# Patient Record
Sex: MALE | Race: OTHER | Employment: UNEMPLOYED | URBAN - METROPOLITAN AREA
[De-identification: names, ages, dates, MRNs, and addresses within clinical notes are randomized per-mention and may not be internally consistent; named-entity substitution may affect disease eponyms.]

---

## 2024-01-01 ENCOUNTER — TELEPHONE (OUTPATIENT)
Dept: UROLOGY | Age: 54
End: 2024-01-01

## 2024-07-10 PROBLEM — D64.9 NORMOCYTIC ANEMIA: Status: ACTIVE | Noted: 2024-07-10

## 2024-07-10 PROBLEM — E11.9 DM (DIABETES MELLITUS) (HCC): Status: ACTIVE | Noted: 2024-07-10

## 2024-07-10 PROBLEM — N17.9 AKI (ACUTE KIDNEY INJURY) (HCC): Status: ACTIVE | Noted: 2024-07-10

## 2024-07-10 PROBLEM — R80.9 PROTEINURIA: Status: ACTIVE | Noted: 2024-07-10

## 2024-07-10 PROBLEM — E77.8 HYPOPROTEINEMIA (HCC): Status: ACTIVE | Noted: 2024-07-10

## 2024-07-10 PROBLEM — I16.1 HYPERTENSIVE EMERGENCY: Status: ACTIVE | Noted: 2024-07-10

## 2024-07-18 PROBLEM — N50.89 SCROTAL EDEMA: Status: ACTIVE | Noted: 2024-07-18

## 2024-07-24 NOTE — TELEPHONE ENCOUNTER
Pt's Bon [employer] Rep came in practice to follow up on pt referral.  Pt was in hospital on 07/10/2024. Saw Sherrie. NP were away at lunch when representative came by practice. Spoke with Sharlene who suggest putting pt on cath schedule for Monday, 07/29/2024 for voiding trial. If voiding trial fail then we would reinsert cath and schedule him with a NP.

## 2024-07-25 ENCOUNTER — OFFICE VISIT (OUTPATIENT)
Dept: PRIMARY CARE CLINIC | Facility: CLINIC | Age: 54
End: 2024-07-25

## 2024-07-25 ENCOUNTER — HOSPITAL ENCOUNTER (INPATIENT)
Age: 54
LOS: 6 days | Discharge: HOME | End: 2024-07-31
Attending: STUDENT IN AN ORGANIZED HEALTH CARE EDUCATION/TRAINING PROGRAM | Admitting: FAMILY MEDICINE

## 2024-07-25 ENCOUNTER — APPOINTMENT (OUTPATIENT)
Dept: GENERAL RADIOLOGY | Age: 54
End: 2024-07-25

## 2024-07-25 VITALS
DIASTOLIC BLOOD PRESSURE: 65 MMHG | HEIGHT: 62 IN | SYSTOLIC BLOOD PRESSURE: 133 MMHG | RESPIRATION RATE: 18 BRPM | HEART RATE: 88 BPM | TEMPERATURE: 98.3 F | BODY MASS INDEX: 35.7 KG/M2 | OXYGEN SATURATION: 88 % | WEIGHT: 194 LBS

## 2024-07-25 DIAGNOSIS — R06.02 SHORTNESS OF BREATH: Primary | ICD-10-CM

## 2024-07-25 DIAGNOSIS — N17.9 AKI (ACUTE KIDNEY INJURY) (HCC): Primary | ICD-10-CM

## 2024-07-25 DIAGNOSIS — I50.9 ACUTE ON CHRONIC CONGESTIVE HEART FAILURE, UNSPECIFIED HEART FAILURE TYPE (HCC): ICD-10-CM

## 2024-07-25 DIAGNOSIS — N18.9 CHRONIC KIDNEY DISEASE, UNSPECIFIED CKD STAGE: ICD-10-CM

## 2024-07-25 DIAGNOSIS — R60.1 ANASARCA: ICD-10-CM

## 2024-07-25 DIAGNOSIS — E11.29 TYPE 2 DIABETES MELLITUS WITH OTHER DIABETIC KIDNEY COMPLICATION, WITH LONG-TERM CURRENT USE OF INSULIN (HCC): ICD-10-CM

## 2024-07-25 DIAGNOSIS — Z79.4 TYPE 2 DIABETES MELLITUS WITH OTHER DIABETIC KIDNEY COMPLICATION, WITH LONG-TERM CURRENT USE OF INSULIN (HCC): ICD-10-CM

## 2024-07-25 PROBLEM — J81.0 PULMONARY EDEMA, ACUTE (HCC): Status: ACTIVE | Noted: 2024-07-25

## 2024-07-25 LAB
ALBUMIN SERPL-MCNC: 1.7 G/DL (ref 3.5–5)
ALBUMIN/GLOB SERPL: 0.4 (ref 1–1.9)
ALP SERPL-CCNC: 183 U/L (ref 40–129)
ALT SERPL-CCNC: 19 U/L (ref 12–65)
ANION GAP SERPL CALC-SCNC: 9 MMOL/L (ref 9–18)
AST SERPL-CCNC: 31 U/L (ref 15–37)
BASOPHILS # BLD: 0 K/UL (ref 0–0.2)
BASOPHILS NFR BLD: 1 % (ref 0–2)
BILIRUB SERPL-MCNC: <0.2 MG/DL (ref 0–1.2)
BILIRUB UR QL: NEGATIVE
BUN SERPL-MCNC: 56 MG/DL (ref 6–23)
CALCIUM SERPL-MCNC: 8.3 MG/DL (ref 8.8–10.2)
CHLORIDE SERPL-SCNC: 96 MMOL/L (ref 98–107)
CO2 SERPL-SCNC: 24 MMOL/L (ref 20–28)
CREAT SERPL-MCNC: 4.36 MG/DL (ref 0.8–1.3)
DIFFERENTIAL METHOD BLD: ABNORMAL
EOSINOPHIL # BLD: 0.3 K/UL (ref 0–0.8)
EOSINOPHIL NFR BLD: 3 % (ref 0.5–7.8)
ERYTHROCYTE [DISTWIDTH] IN BLOOD BY AUTOMATED COUNT: 13.3 % (ref 11.9–14.6)
GLOBULIN SER CALC-MCNC: 4.6 G/DL (ref 2.3–3.5)
GLUCOSE BLD STRIP.AUTO-MCNC: 136 MG/DL (ref 65–100)
GLUCOSE BLD STRIP.AUTO-MCNC: 137 MG/DL (ref 65–100)
GLUCOSE SERPL-MCNC: 172 MG/DL (ref 70–99)
GLUCOSE UR QL STRIP.AUTO: 100 MG/DL
GLUCOSE, POC: 203 MG/DL
HCT VFR BLD AUTO: 26.7 % (ref 41.1–50.3)
HGB BLD-MCNC: 8.8 G/DL (ref 13.6–17.2)
IMM GRANULOCYTES # BLD AUTO: 0 K/UL (ref 0–0.5)
IMM GRANULOCYTES NFR BLD AUTO: 0 % (ref 0–5)
KETONES UR-MCNC: NEGATIVE MG/DL
LEUKOCYTE ESTERASE UR QL STRIP: ABNORMAL
LYMPHOCYTES # BLD: 1.5 K/UL (ref 0.5–4.6)
LYMPHOCYTES NFR BLD: 17 % (ref 13–44)
MAGNESIUM SERPL-MCNC: 2.4 MG/DL (ref 1.8–2.4)
MCH RBC QN AUTO: 30.9 PG (ref 26.1–32.9)
MCHC RBC AUTO-ENTMCNC: 33 G/DL (ref 31.4–35)
MCV RBC AUTO: 93.7 FL (ref 82–102)
MONOCYTES # BLD: 0.5 K/UL (ref 0.1–1.3)
MONOCYTES NFR BLD: 6 % (ref 4–12)
NEUTS SEG # BLD: 6.5 K/UL (ref 1.7–8.2)
NEUTS SEG NFR BLD: 73 % (ref 43–78)
NITRITE UR QL: NEGATIVE
NRBC # BLD: 0 K/UL (ref 0–0.2)
NT PRO BNP: ABNORMAL PG/ML (ref 0–125)
PH UR: 6 (ref 5–9)
PLATELET # BLD AUTO: 399 K/UL (ref 150–450)
PMV BLD AUTO: 8.5 FL (ref 9.4–12.3)
POTASSIUM SERPL-SCNC: 3.9 MMOL/L (ref 3.5–5.1)
PROT SERPL-MCNC: 6.3 G/DL (ref 6.3–8.2)
PROT UR QL: >300 MG/DL
RBC # BLD AUTO: 2.85 M/UL (ref 4.23–5.6)
RBC # UR STRIP: ABNORMAL
SERVICE CMNT-IMP: ABNORMAL
SODIUM SERPL-SCNC: 130 MMOL/L (ref 136–145)
SP GR UR: 1.02 (ref 1–1.02)
TROPONIN T SERPL HS-MCNC: 127 NG/L (ref 0–22)
TROPONIN T SERPL HS-MCNC: 143 NG/L (ref 0–22)
TROPONIN T SERPL HS-MCNC: 143 NG/L (ref 0–22)
UROBILINOGEN UR QL: 0.2 EU/DL (ref 0.2–1)
WBC # BLD AUTO: 8.9 K/UL (ref 4.3–11.1)

## 2024-07-25 PROCEDURE — 84484 ASSAY OF TROPONIN QUANT: CPT

## 2024-07-25 PROCEDURE — 83735 ASSAY OF MAGNESIUM: CPT

## 2024-07-25 PROCEDURE — 2580000003 HC RX 258: Performed by: FAMILY MEDICINE

## 2024-07-25 PROCEDURE — 82962 GLUCOSE BLOOD TEST: CPT

## 2024-07-25 PROCEDURE — 93005 ELECTROCARDIOGRAM TRACING: CPT | Performed by: STUDENT IN AN ORGANIZED HEALTH CARE EDUCATION/TRAINING PROGRAM

## 2024-07-25 PROCEDURE — 99285 EMERGENCY DEPT VISIT HI MDM: CPT

## 2024-07-25 PROCEDURE — 1100000000 HC RM PRIVATE

## 2024-07-25 PROCEDURE — 6370000000 HC RX 637 (ALT 250 FOR IP): Performed by: FAMILY MEDICINE

## 2024-07-25 PROCEDURE — 83880 ASSAY OF NATRIURETIC PEPTIDE: CPT

## 2024-07-25 PROCEDURE — 80053 COMPREHEN METABOLIC PANEL: CPT

## 2024-07-25 PROCEDURE — 96374 THER/PROPH/DIAG INJ IV PUSH: CPT

## 2024-07-25 PROCEDURE — 81003 URINALYSIS AUTO W/O SCOPE: CPT

## 2024-07-25 PROCEDURE — 85025 COMPLETE CBC W/AUTO DIFF WBC: CPT

## 2024-07-25 PROCEDURE — 6360000002 HC RX W HCPCS: Performed by: STUDENT IN AN ORGANIZED HEALTH CARE EDUCATION/TRAINING PROGRAM

## 2024-07-25 PROCEDURE — 36415 COLL VENOUS BLD VENIPUNCTURE: CPT

## 2024-07-25 PROCEDURE — 71046 X-RAY EXAM CHEST 2 VIEWS: CPT

## 2024-07-25 RX ORDER — INSULIN GLARGINE 100 [IU]/ML
3 INJECTION, SOLUTION SUBCUTANEOUS EVERY MORNING
Status: DISCONTINUED | OUTPATIENT
Start: 2024-07-26 | End: 2024-07-28

## 2024-07-25 RX ORDER — DEXTROSE MONOHYDRATE 100 MG/ML
INJECTION, SOLUTION INTRAVENOUS CONTINUOUS PRN
Status: DISCONTINUED | OUTPATIENT
Start: 2024-07-25 | End: 2024-07-31 | Stop reason: HOSPADM

## 2024-07-25 RX ORDER — SODIUM CHLORIDE 9 MG/ML
INJECTION, SOLUTION INTRAVENOUS PRN
Status: DISCONTINUED | OUTPATIENT
Start: 2024-07-25 | End: 2024-07-31 | Stop reason: HOSPADM

## 2024-07-25 RX ORDER — BISACODYL 10 MG
10 SUPPOSITORY, RECTAL RECTAL DAILY PRN
Status: DISCONTINUED | OUTPATIENT
Start: 2024-07-25 | End: 2024-07-31 | Stop reason: HOSPADM

## 2024-07-25 RX ORDER — AMLODIPINE BESYLATE 10 MG/1
10 TABLET ORAL DAILY
Status: DISCONTINUED | OUTPATIENT
Start: 2024-07-26 | End: 2024-07-31 | Stop reason: HOSPADM

## 2024-07-25 RX ORDER — POLYETHYLENE GLYCOL 3350 17 G/17G
17 POWDER, FOR SOLUTION ORAL DAILY PRN
Status: DISCONTINUED | OUTPATIENT
Start: 2024-07-25 | End: 2024-07-31 | Stop reason: HOSPADM

## 2024-07-25 RX ORDER — HYDRALAZINE HYDROCHLORIDE 50 MG/1
50 TABLET, FILM COATED ORAL EVERY 8 HOURS SCHEDULED
Status: DISCONTINUED | OUTPATIENT
Start: 2024-07-25 | End: 2024-07-31 | Stop reason: HOSPADM

## 2024-07-25 RX ORDER — INSULIN LISPRO 100 [IU]/ML
0-8 INJECTION, SOLUTION INTRAVENOUS; SUBCUTANEOUS
Status: DISCONTINUED | OUTPATIENT
Start: 2024-07-25 | End: 2024-07-31 | Stop reason: HOSPADM

## 2024-07-25 RX ORDER — ACETAMINOPHEN 325 MG/1
650 TABLET ORAL EVERY 6 HOURS PRN
Status: DISCONTINUED | OUTPATIENT
Start: 2024-07-25 | End: 2024-07-31 | Stop reason: HOSPADM

## 2024-07-25 RX ORDER — IBUPROFEN 600 MG/1
1 TABLET ORAL PRN
Status: DISCONTINUED | OUTPATIENT
Start: 2024-07-25 | End: 2024-07-31 | Stop reason: HOSPADM

## 2024-07-25 RX ORDER — TAMSULOSIN HYDROCHLORIDE 0.4 MG/1
0.4 CAPSULE ORAL DAILY
Status: DISCONTINUED | OUTPATIENT
Start: 2024-07-25 | End: 2024-07-31 | Stop reason: HOSPADM

## 2024-07-25 RX ORDER — MAGNESIUM HYDROXIDE/ALUMINUM HYDROXICE/SIMETHICONE 120; 1200; 1200 MG/30ML; MG/30ML; MG/30ML
30 SUSPENSION ORAL EVERY 6 HOURS PRN
Status: DISCONTINUED | OUTPATIENT
Start: 2024-07-25 | End: 2024-07-31 | Stop reason: HOSPADM

## 2024-07-25 RX ORDER — TORSEMIDE 20 MG/1
60 TABLET ORAL DAILY
Status: DISCONTINUED | OUTPATIENT
Start: 2024-07-25 | End: 2024-07-31

## 2024-07-25 RX ORDER — INSULIN LISPRO 100 [IU]/ML
0-4 INJECTION, SOLUTION INTRAVENOUS; SUBCUTANEOUS NIGHTLY
Status: DISCONTINUED | OUTPATIENT
Start: 2024-07-25 | End: 2024-07-31 | Stop reason: HOSPADM

## 2024-07-25 RX ORDER — FUROSEMIDE 10 MG/ML
60 INJECTION INTRAMUSCULAR; INTRAVENOUS ONCE
Status: COMPLETED | OUTPATIENT
Start: 2024-07-25 | End: 2024-07-25

## 2024-07-25 RX ORDER — ACETAMINOPHEN 650 MG/1
650 SUPPOSITORY RECTAL EVERY 6 HOURS PRN
Status: DISCONTINUED | OUTPATIENT
Start: 2024-07-25 | End: 2024-07-31 | Stop reason: HOSPADM

## 2024-07-25 RX ORDER — SODIUM CHLORIDE 0.9 % (FLUSH) 0.9 %
5-40 SYRINGE (ML) INJECTION PRN
Status: DISCONTINUED | OUTPATIENT
Start: 2024-07-25 | End: 2024-07-31 | Stop reason: HOSPADM

## 2024-07-25 RX ORDER — FAMOTIDINE 20 MG/1
10 TABLET, FILM COATED ORAL DAILY PRN
Status: DISCONTINUED | OUTPATIENT
Start: 2024-07-25 | End: 2024-07-31 | Stop reason: HOSPADM

## 2024-07-25 RX ORDER — SODIUM CHLORIDE 0.9 % (FLUSH) 0.9 %
5-40 SYRINGE (ML) INJECTION EVERY 12 HOURS SCHEDULED
Status: DISCONTINUED | OUTPATIENT
Start: 2024-07-25 | End: 2024-07-31 | Stop reason: HOSPADM

## 2024-07-25 RX ADMIN — HYDRALAZINE HYDROCHLORIDE 50 MG: 50 TABLET ORAL at 20:11

## 2024-07-25 RX ADMIN — TORSEMIDE 60 MG: 20 TABLET ORAL at 20:11

## 2024-07-25 RX ADMIN — TAMSULOSIN HYDROCHLORIDE 0.4 MG: 0.4 CAPSULE ORAL at 20:11

## 2024-07-25 RX ADMIN — FUROSEMIDE 60 MG: 10 INJECTION, SOLUTION INTRAMUSCULAR; INTRAVENOUS at 16:44

## 2024-07-25 RX ADMIN — SODIUM CHLORIDE, PRESERVATIVE FREE 10 ML: 5 INJECTION INTRAVENOUS at 20:11

## 2024-07-25 ASSESSMENT — PAIN SCALES - GENERAL
PAINLEVEL_OUTOF10: 0
PAINLEVEL_OUTOF10: 0

## 2024-07-25 ASSESSMENT — ENCOUNTER SYMPTOMS
CONSTIPATION: 0
SHORTNESS OF BREATH: 1
DIARRHEA: 0
COUGH: 0
ABDOMINAL DISTENTION: 1
STRIDOR: 0
NAUSEA: 0
CHOKING: 0
COLOR CHANGE: 0
VOMITING: 0

## 2024-07-25 ASSESSMENT — LIFESTYLE VARIABLES
HOW MANY STANDARD DRINKS CONTAINING ALCOHOL DO YOU HAVE ON A TYPICAL DAY: 1 OR 2
HOW OFTEN DO YOU HAVE A DRINK CONTAINING ALCOHOL: MONTHLY OR LESS

## 2024-07-25 NOTE — ED PROVIDER NOTES
Emergency Department Provider Note       PCP: No primary care provider on file.   Age: 53 y.o.   Sex: male     DISPOSITION Admitted 07/26/2024 03:36:02 PM       ICD-10-CM    1. ENDY (acute kidney injury) ([facility])  N17.9 Vascular duplex vein mapping upper right     Vascular duplex vein mapping upper right      2. Acute on chronic congestive heart failure, unspecified heart failure type (HCC)  I50.9       3. Chronic kidney disease, unspecified CKD stage  N18.9       4. Anasarca  R60.1           Medical Decision Making     Palauan-speaking male patient with history of chronic kidney disease, fluid overload returns to this department from local primary care office with progressive edema, shortness of breath  Review of note from office visit today as patient was being seen in follow-up after his recent admission, saturations between 90 and 88%, patient does not require oxygen at baseline  He reports compliance with current medications including his furosemide  He appears very edematous extending up to the abdomen and has Rales bilaterally  Will repeat lab work, obtain x-ray imaging.  Patient will require IV diuretics and likely admission    Laboratory testing appears overall fairly stable, patient appears significantly edematous consistent with anasarca in my opinion.  This is despite his furosemide use at home.  I think would benefit from admission, discussed this plan of care with patient voices understanding agreement.  Hospitalist, agrees to evaluate for admission    ED Course as of 07/26/24 1536   Thu Jul 25, 2024   1525 EKG interpretation: Sinus rhythm, rate 86, axis, no obvious ischemia [BR]   1631 EKG stable trop and BNP significantly elevated. Kidney function fairly stable with comparison to previous.  [BR]      ED Course User Index  [BR] Daniel Ramsey R, DO     1 or more acute illnesses that pose a threat to life or bodily function.   1 or more chronic illnesses with a severe exacerbation or  Comments: Edema present to the lower abdomen.  No rebound guarding or distention   Musculoskeletal:         General: No swelling, tenderness or deformity. Normal range of motion.      Cervical back: Normal range of motion.      Comments: Bilateral lower extremity edema extending the length of the legs bilaterally.  3+ pitting in nature.   Skin:     General: Skin is warm.      Capillary Refill: Capillary refill takes less than 2 seconds.   Neurological:      General: No focal deficit present.      Mental Status: He is alert.   Psychiatric:         Mood and Affect: Mood normal.        Procedures     Procedures    Orders Placed This Encounter   Procedures    XR CHEST (2 VW)    CBC with Auto Differential    Comprehensive Metabolic Panel    Magnesium    Brain Natriuretic Peptide    Troponin    Basic Metabolic Panel w/ Reflex to MG    Troponin    Troponin    Vitamin B12    Folate    Iron and TIBC    Transferrin    Ferritin    Lipid Panel    Brain Natriuretic Peptide    Magnesium    Phosphorus    Basic Metabolic Panel    Hemoglobin and Hematocrit    Platelet Count    TSH with Reflex    ADULT DIET; Regular; 4 carb choices (60 gm/meal); Low Fat/Low Chol/High Fiber/2 gm Na; 2000 ml    POCT Urine Dipstick    Vital signs (specify frequency)    Up with assistance    Initiate Hypoglycemia Treatment    Telemetry monitoring - 24 hour duration    Place intermittent pneumatic compression device    HYPOGLYCEMIA TREATMENT: blood glucose less than 70 mg/dL and patient ALERT and TOLERATING PO    HYPOGLYCEMIA TREATMENT: blood glucose less than 70 mg/dL and patient NOT ALERT or NPO    Strict intake and output    Bladder scan    Intake and output    Full code    Inpatient consult to Nephrology    Inpatient consult to Diabetes educator    Initiate Oxygen Therapy Protocol    POCT Glucose    POCT Urinalysis no Micro    POCT Glucose    POCT Glucose    POCT Glucose    POCT Glucose    EKG 12 Lead    ADMIT TO INPATIENT    Vascular duplex vein

## 2024-07-25 NOTE — ED TRIAGE NOTES
Pt to triage in wheelchair. Certified Filipino interpretor Joshua #[id number] used in triage process. Pt complains of SOB onset last PM. Pt also complains of generalized edema x2 days. Denies CP.

## 2024-07-25 NOTE — PROGRESS NOTES
Vince Avelar (:  1970) is a 53 y.o. male here for evaluation of the following chief complaint(s):  New Patient, Follow-Up from Hospital, Diabetes, Chronic Kidney Disease, and Shortness of Breath      Assessment & Plan   ASSESSMENT/PLAN:  1. Shortness of breath  Comments:  O2 saturation 88-90%, acutely worsening SOB since last night with worsening edema, pt referred to ER for further evaluation and management,Son will take him now  2. Type 2 diabetes mellitus with other diabetic kidney complication, with long-term current use of insulin ([facility])  -     AMB POC GLUCOSE BLOOD, BY GLUCOSE MONITORING DEVICE      Results for orders placed or performed in visit on 24   AMB POC GLUCOSE BLOOD, BY GLUCOSE MONITORING DEVICE   Result Value Ref Range    Glucose,  MG/DL        No follow-ups on file.         Subjective   SUBJECTIVE/OBJECTIVE:  New patient here today for a hospital follow up. He initially presented to [facility] (no records available) and had abnormal labs and was referred to the ER. From discharge summary:    \"Didier Avelar is a 53 y.o. Montenegrin-speaking male with a PMH of DM2 who presented to the on 7/10/2024 due to abnormal labs.  Patient noted worsening BLE edema over the last 3 months.  He was seen at a free clinic about 7-8 days PTA and noted to be hypertensive so he was started on losartan-HCTZ 50-12.5mg daily.  He had follow-up lab work around  which showed elevated creatinine so he was instructed to go to the ER for evaluation.       In the ER, he was noted to be hypertensive with /102.  Labs showed creatinine 3.62, proBNP 11k, WBC 9.8, and hemoglobin 10.7.  CXR showed pulmonary vascular congestion and bilateral pleural effusions.  He received Lasix 80 mg IV, nephrology was consulted, and he was admitted for further care.       Pt was admitted with ENDY in setting of urinary retention as well.  Nephrology was on board.  He was started on IV Lasix

## 2024-07-25 NOTE — PROGRESS NOTES
Patient arrived to floor, via stretcher, able to stand and pivot to the bed, with little assistance. Oliver patent, draining clear yellow urine. Generalized swelling throughout. Scrotum, edematous. Standing weight  190. Son at bedside. Verified medications with son, lasix prescriptions was filled at [facility], and patient has been compliant. Tele # [phone] NSR. Blood sugar obtained.  137.  Patient reporting tunneled vision, worse after visit to the optometrists.  Left eye worse than right.

## 2024-07-25 NOTE — H&P
Hospitalist History and Physical   Admit Date:  2024  3:37 PM   Name:  Vince Avelar   Age:  53 y.o.  Sex:  male  :  1970   MRN:  [id number]   Room:  ER    Presenting/Chief Complaint: Shortness of Breath     Reason(s) for Admission: Pulmonary edema, acute (HCC) [J81.0]     History of Present Illness:   Vince Avelar is a 53 y.o. male who presented with worsening edema and low oxygen readings 88% on RA over the past two days. Having SOB with no chest pain.     Hx of DM type II, HTN and recent discharge on  for pulmonary edema, anasarca, ENDY in setting of urinary retention with Oliver, nodular diabetic glomerulosclerosis now on toresemide 60mg daily but admits to not taking his new medication of torsemide    I used official Montserratian interpretor   Assessment & Plan:     Active Problems:    Pulmonary edema in setting of worsening renal failure - consult nephrology to see in AM. Strict Is and Os. FR. Start his torsemide 60mg daily. Lasix 60mg given in ER    Elevated troponin - no chest pain. Trend troponin. Remote tele     Normocytic anemia - stable    DM type II - A1C 6.9 on 7/10/24, SS, lantus 3 units in the AM. He denies taking the scheduled humalog 3 units before meals.     HTN - amlodipine, hydralazine,     BPH - flomax    Urinary retention - Still with Oliver       PT/OT evals ordered?  Not ordered; patient not expected to need rehab  Diet: ADULT DIET; Regular; 4 carb choices (60 gm/meal); Low Fat/Low Chol/High Fiber/2 gm Na; 2000 ml  VTE prophylaxis: SCD's   Code status: Full Code      Non-peripheral Lines and Tubes (if present):             Hospital Problems:  Principal Problem:    Pulmonary edema, acute (HCC)  Active Problems:    ENDY (acute kidney injury) (HCC)    DM (diabetes mellitus) (HCC)    Normocytic anemia  Resolved Problems:    * No resolved hospital problems. *        Objective:   Patient Vitals for the past 24 hrs:   Temp Pulse Resp BP SpO2  Negative NEG mg/dL    Bilirubin, Urine, POC Negative NEG      Blood, UA POC SMALL (A) NEG      URINE UROBILINOGEN POC 0.2 0.2 - 1.0 EU/dL    Nitrite, Urine, POC Negative NEG      Leukocyte Est, UA POC SMALL (A) NEG      Performed by: Ne Stacy        No results for input(s): \"COVID19\" in the last 72 hours.    XR CHEST (2 VW)    Result Date: 7/25/2024  Chest X-ray INDICATION: sob COMPARISON: 17 July 2024 TECHNIQUE: PA and lateral views of the chest were obtained.     Findings/impression: Ill-defined opacities at the bilateral lung bases likely represents a combination of pleural effusions and atelectasis. Prominence of interstitial markings and central pulmonary vasculature likely reflecting edema. The heart is enlarged. The findings taken together likely reflect CHF. Electronically signed by Ang Elmore        Signed:  CATERINA TIWARI DO    Part of this note may have been written by using a voice dictation software.  The note has been proof read but may still contain some grammatical/other typographical errors.

## 2024-07-26 ENCOUNTER — APPOINTMENT (OUTPATIENT)
Dept: ULTRASOUND IMAGING | Age: 54
End: 2024-07-26

## 2024-07-26 PROBLEM — N28.89 RIGHT RENAL MASS: Status: ACTIVE | Noted: 2021-12-15

## 2024-07-26 PROBLEM — E87.70 HYPERVOLEMIA: Status: ACTIVE | Noted: 2024-07-26

## 2024-07-26 PROBLEM — N13.8 BENIGN PROSTATIC HYPERPLASIA WITH URINARY OBSTRUCTION: Status: ACTIVE | Noted: 2022-01-03

## 2024-07-26 PROBLEM — R06.89 ACUTE RESPIRATORY INSUFFICIENCY: Status: ACTIVE | Noted: 2024-07-26

## 2024-07-26 PROBLEM — H53.9 VISION CHANGES: Status: ACTIVE | Noted: 2024-07-26

## 2024-07-26 PROBLEM — I10 HTN (HYPERTENSION): Status: ACTIVE | Noted: 2024-07-26

## 2024-07-26 PROBLEM — R79.89 TROPONIN I ABOVE REFERENCE RANGE: Status: ACTIVE | Noted: 2024-07-26

## 2024-07-26 PROBLEM — E87.1 HYPONATREMIA: Status: ACTIVE | Noted: 2024-07-26

## 2024-07-26 PROBLEM — N28.9 RENAL LESION: Status: ACTIVE | Noted: 2024-07-26

## 2024-07-26 PROBLEM — E53.8 FOLATE DEFICIENCY: Status: ACTIVE | Noted: 2024-07-26

## 2024-07-26 PROBLEM — N40.1 BENIGN PROSTATIC HYPERPLASIA WITH URINARY OBSTRUCTION: Status: ACTIVE | Noted: 2022-01-03

## 2024-07-26 LAB
ANION GAP SERPL CALC-SCNC: 8 MMOL/L (ref 9–18)
BASOPHILS # BLD: 0.1 K/UL (ref 0–0.2)
BASOPHILS NFR BLD: 1 % (ref 0–2)
BUN SERPL-MCNC: 57 MG/DL (ref 6–23)
CALCIUM SERPL-MCNC: 7.8 MG/DL (ref 8.8–10.2)
CHLORIDE SERPL-SCNC: 98 MMOL/L (ref 98–107)
CO2 SERPL-SCNC: 24 MMOL/L (ref 20–28)
CREAT SERPL-MCNC: 4.29 MG/DL (ref 0.8–1.3)
DIFFERENTIAL METHOD BLD: ABNORMAL
EKG ATRIAL RATE: 86 BPM
EKG DIAGNOSIS: NORMAL
EKG P AXIS: 57 DEGREES
EKG P-R INTERVAL: 156 MS
EKG Q-T INTERVAL: 370 MS
EKG QRS DURATION: 88 MS
EKG QTC CALCULATION (BAZETT): 442 MS
EKG R AXIS: 86 DEGREES
EKG T AXIS: 73 DEGREES
EKG VENTRICULAR RATE: 86 BPM
EOSINOPHIL # BLD: 0.3 K/UL (ref 0–0.8)
EOSINOPHIL NFR BLD: 4 % (ref 0.5–7.8)
ERYTHROCYTE [DISTWIDTH] IN BLOOD BY AUTOMATED COUNT: 13.2 % (ref 11.9–14.6)
GLUCOSE BLD STRIP.AUTO-MCNC: 123 MG/DL (ref 65–100)
GLUCOSE BLD STRIP.AUTO-MCNC: 125 MG/DL (ref 65–100)
GLUCOSE BLD STRIP.AUTO-MCNC: 129 MG/DL (ref 65–100)
GLUCOSE BLD STRIP.AUTO-MCNC: 145 MG/DL (ref 65–100)
GLUCOSE SERPL-MCNC: 120 MG/DL (ref 70–99)
HCT VFR BLD AUTO: 22.7 % (ref 41.1–50.3)
HGB BLD-MCNC: 7.6 G/DL (ref 13.6–17.2)
IMM GRANULOCYTES # BLD AUTO: 0 K/UL (ref 0–0.5)
IMM GRANULOCYTES NFR BLD AUTO: 1 % (ref 0–5)
LYMPHOCYTES # BLD: 1.5 K/UL (ref 0.5–4.6)
LYMPHOCYTES NFR BLD: 21 % (ref 13–44)
MCH RBC QN AUTO: 31.5 PG (ref 26.1–32.9)
MCHC RBC AUTO-ENTMCNC: 33.5 G/DL (ref 31.4–35)
MCV RBC AUTO: 94.2 FL (ref 82–102)
MONOCYTES # BLD: 0.5 K/UL (ref 0.1–1.3)
MONOCYTES NFR BLD: 7 % (ref 4–12)
NEUTS SEG # BLD: 4.8 K/UL (ref 1.7–8.2)
NEUTS SEG NFR BLD: 66 % (ref 43–78)
NRBC # BLD: 0 K/UL (ref 0–0.2)
PLATELET # BLD AUTO: 327 K/UL (ref 150–450)
PMV BLD AUTO: 8.6 FL (ref 9.4–12.3)
POTASSIUM SERPL-SCNC: 3.8 MMOL/L (ref 3.5–5.1)
RBC # BLD AUTO: 2.41 M/UL (ref 4.23–5.6)
SERVICE CMNT-IMP: ABNORMAL
SODIUM SERPL-SCNC: 130 MMOL/L (ref 136–145)
WBC # BLD AUTO: 7.1 K/UL (ref 4.3–11.1)

## 2024-07-26 PROCEDURE — 93971 EXTREMITY STUDY: CPT | Performed by: RADIOLOGY

## 2024-07-26 PROCEDURE — 6370000000 HC RX 637 (ALT 250 FOR IP): Performed by: FAMILY MEDICINE

## 2024-07-26 PROCEDURE — 1100000000 HC RM PRIVATE

## 2024-07-26 PROCEDURE — 85025 COMPLETE CBC W/AUTO DIFF WBC: CPT

## 2024-07-26 PROCEDURE — 93971 EXTREMITY STUDY: CPT

## 2024-07-26 PROCEDURE — 6360000002 HC RX W HCPCS: Performed by: NURSE PRACTITIONER

## 2024-07-26 PROCEDURE — 2580000003 HC RX 258: Performed by: FAMILY MEDICINE

## 2024-07-26 PROCEDURE — 82962 GLUCOSE BLOOD TEST: CPT

## 2024-07-26 PROCEDURE — 93010 ELECTROCARDIOGRAM REPORT: CPT | Performed by: INTERNAL MEDICINE

## 2024-07-26 PROCEDURE — 80048 BASIC METABOLIC PNL TOTAL CA: CPT

## 2024-07-26 PROCEDURE — 36415 COLL VENOUS BLD VENIPUNCTURE: CPT

## 2024-07-26 RX ORDER — POLYETHYLENE GLYCOL 3350 17 G/17G
17 POWDER, FOR SOLUTION ORAL DAILY
Status: DISCONTINUED | OUTPATIENT
Start: 2024-07-26 | End: 2024-07-28

## 2024-07-26 RX ORDER — LABETALOL HYDROCHLORIDE 5 MG/ML
5 INJECTION, SOLUTION INTRAVENOUS EVERY 6 HOURS PRN
Status: DISCONTINUED | OUTPATIENT
Start: 2024-07-26 | End: 2024-07-31 | Stop reason: HOSPADM

## 2024-07-26 RX ORDER — FOLIC ACID 1 MG/1
1 TABLET ORAL DAILY
Status: DISCONTINUED | OUTPATIENT
Start: 2024-07-26 | End: 2024-07-31 | Stop reason: HOSPADM

## 2024-07-26 RX ORDER — FINASTERIDE 5 MG/1
5 TABLET, FILM COATED ORAL DAILY
Status: DISCONTINUED | OUTPATIENT
Start: 2024-07-26 | End: 2024-07-31 | Stop reason: HOSPADM

## 2024-07-26 RX ADMIN — FOLIC ACID 1 MG: 1 TABLET ORAL at 20:45

## 2024-07-26 RX ADMIN — TAMSULOSIN HYDROCHLORIDE 0.4 MG: 0.4 CAPSULE ORAL at 09:40

## 2024-07-26 RX ADMIN — HYDRALAZINE HYDROCHLORIDE 50 MG: 50 TABLET ORAL at 14:02

## 2024-07-26 RX ADMIN — POLYETHYLENE GLYCOL 3350 17 G: 17 POWDER, FOR SOLUTION ORAL at 15:10

## 2024-07-26 RX ADMIN — TORSEMIDE 60 MG: 20 TABLET ORAL at 09:40

## 2024-07-26 RX ADMIN — AMLODIPINE BESYLATE 10 MG: 10 TABLET ORAL at 09:40

## 2024-07-26 RX ADMIN — INSULIN GLARGINE 3 UNITS: 100 INJECTION, SOLUTION SUBCUTANEOUS at 09:40

## 2024-07-26 RX ADMIN — FINASTERIDE 5 MG: 5 TABLET, FILM COATED ORAL at 09:45

## 2024-07-26 RX ADMIN — SODIUM CHLORIDE, PRESERVATIVE FREE 10 ML: 5 INJECTION INTRAVENOUS at 20:48

## 2024-07-26 RX ADMIN — HYDRALAZINE HYDROCHLORIDE 50 MG: 50 TABLET ORAL at 05:52

## 2024-07-26 RX ADMIN — HYDRALAZINE HYDROCHLORIDE 50 MG: 50 TABLET ORAL at 20:45

## 2024-07-26 RX ADMIN — BUMETANIDE 1 MG/HR: 0.25 INJECTION INTRAMUSCULAR; INTRAVENOUS at 10:34

## 2024-07-26 RX ADMIN — BUMETANIDE 1 MG/HR: 0.25 INJECTION INTRAMUSCULAR; INTRAVENOUS at 20:45

## 2024-07-26 RX ADMIN — SODIUM CHLORIDE, PRESERVATIVE FREE 10 ML: 5 INJECTION INTRAVENOUS at 09:40

## 2024-07-26 ASSESSMENT — PAIN SCALES - GENERAL: PAINLEVEL_OUTOF10: 0

## 2024-07-26 NOTE — CONSULTS
Nephrology consult    Admission Date:  7/25/2024    Admission Diagnosis  Pulmonary edema, acute (HCC) [J81.0]    We are asked by     Sara Victor DO for anasarca, worsening renal failure       History of Present Illness: Didier Avelar is a 54 yo male with a PMH of hypertension, type 2 DM, urinary retention, and anemia. Pt with recent admission for same, discharged on 7/19. H/P notes that patient was not taking his Torsemide as prescribed at discharge; however, pt is interviewed with  from  Wellness team, and she states that she personally helped with medication instructions, and that he has been compliant with his medication at home.  Diabetes educator present at time of my visit, who confirms that pills were missing from bottle to indicate he has been taking them.     Kidney biopsy was performed during last admission (7/15) which demonstrates globally sclerosed glomerulus, focal interstitial fibrosis, tubular atrophy, and moderate arterial sclerosis.     Pt is seen at bedside. Son present, as well as diabetes educator.  from wellness via phone.  Appears comfortable on room air, eating breakfast.       No past medical history on file.   Past Surgical History:   Procedure Laterality Date    CT BIOPSY RENAL  7/15/2024    CT BIOPSY RENAL 7/15/2024 [facility] RADIOLOGY CT SCAN      Current Facility-Administered Medications   Medication Dose Route Frequency    finasteride (PROSCAR) tablet 5 mg  5 mg Oral Daily    sodium chloride flush 0.9 % injection 5-40 mL  5-40 mL IntraVENous 2 times per day    sodium chloride flush 0.9 % injection 5-40 mL  5-40 mL IntraVENous PRN    0.9 % sodium chloride infusion   IntraVENous PRN    polyethylene glycol (GLYCOLAX) packet 17 g  17 g Oral Daily PRN    bisacodyl (DULCOLAX) suppository 10 mg  10 mg Rectal Daily PRN    famotidine (PEPCID) tablet 10 mg  10 mg Oral Daily PRN    aluminum & magnesium hydroxide-simethicone (MAALOX) 200-200-20 MG/5ML suspension 30  disease)  -Bcr unclear, but appear to be in mid-to upper-3's  -Renal biopsy 7/15 with globally sclerosed glomerulus, focal interstitial fibrosis, tubular atrophy, and moderate arterial sclerosis.   -Explained to patient and son that kidney disease is severe and will likely need dialysis at some point. Unfortunately, undocumented/uninsured status makes situation difficult  -Avoid nephrotoxic agents  -Accurate I/Os    Anasarca  -Begin IV bumex gtt     Renal Mass -needs urology evaluation. Avoid IV contrast. Has appt with urology as outpatient. Home [place]    DM type 2- insulin per primary    Hypertension   -Amlodipine  -Hydralazine  -Will eventually need ACE/ARB for proteinuria after acute injury resolves    Thank you for this consult. Will follow with you.    Gita Ruvalcaba NP  [facility]

## 2024-07-26 NOTE — PROGRESS NOTES
Patient/caregivers speak French  as their preferred language for their healthcare communication. For safe communication, use the [facility]  carts or call:    Senior /Navigator Gregoria Carr at [phone] or   AMN phone services for [facility] at [phone]    General phone: [phone] ( [phone])  Email: [email or web address]    Always document the use of interpreting services ('s ID number) in your clinical notes.    Our interpreters are available for team members working with limited  English proficient (LEP) patients remotely, via phone or video or in person (if needed for special cases).    When using family members to interpret, for the safety of the patient and protection of the communication of both our patient and [facility] staff the VRI or telephonic  should remain on the line to monitor that all communication is accurate and complete. The  should be instructed to notify [facility] staff immediately if there are any inaccuracies.         Thank you,        Gregoria BERGERON  Senior /Navigator

## 2024-07-26 NOTE — CARE COORDINATION
CM spoke to Mr. Priti Avelar and his son via Canadian translaotr on speaker phone (by Ms. Gregoria Torres in language services).  He is inpatient for acute pulmonary edema.      Prior to admit, Mr. Priti Avelar was fairly independent with ADLs (but said he would benefit from a RW), living in [place], [place]. He is from [facility].  He has been to the clinic ([facility], [phone]), and has his prescriptions with him, including his Demadex (torsemide).  He is currently on room air, and has a Oliver catheter.      We reviewed the nephrologist progress note, including that he might need dialysis some day (but not now).  At discharge, the plan is home, and continued follow up at the [facility] clinic.  CM will continue to follow.       07/26/24 4704   Service Assessment   Patient Orientation Alert and Oriented   Cognition Alert   History Provided By Patient;Child/Family   Primary Caregiver Self   Accompanied By/Relationship son   Support Systems Children   PCP Verified by CM Yes  ([facility])   Last Visit to PCP Within last 3 months   Prior Functional Level Independent in ADLs/IADLs   Current Functional Level Mobility;Other (see comment)  (needs a walker)   Can patient return to prior living arrangement Yes   Ability to make needs known: Good  (via )   Family able to assist with home care needs: Yes   Would you like for me to discuss the discharge plan with any other family members/significant others, and if so, who? Yes  (with son in the room)   Condition of Participation: Discharge Planning   The Plan for Transition of Care is related to the following treatment goals: home when stable

## 2024-07-26 NOTE — PROGRESS NOTES
Provided interpreting services over the phone for Hernan Collins, RN, CM      Thank you,        Gregoria BERGERON  Senior /Navigator   Language Services Department  [phone] (phone)

## 2024-07-26 NOTE — PROGRESS NOTES
daily  Monitor for bleeding  AM H&H and platelets    HTN  Fair control for inpatient setting  Goal SBP less than 180 while inpatient, normotensive outpatient  Home medications:   Amlodipine 10 mg daily-continue  Hydralazine 50 mg 3 times daily-continue  Flomax 0.4 mg-continue  Add finasteride 5 mg daily  PRN medications: labetalol  Maintain BP <180 while inpatient  Monitor     BPH  Continue home Flomax  During last and recent hospital stay failed multiple void trials leading to discharge with Oliver and outpatient urology follow-up  Add finasteride 5 mg q daily  Repeat void trial in 24 to 48 hours    Hypervolemic Hyponatremia  Diuresis as outlined above  AM BMP    Troponemia 2/2 supply/demand mismatch  Troponin delta (143>143>127) with advanced renal dysfunction and associated volume overload, with absence of chest pain and no dynamic changes on EKG excluding ACS  Can need telemetry while on Bumex gtt  Monitor for chest pain    Other active/chronic issues  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  States since this evaluation visit is unchanged from prior.  Continue to hold blood thinners.  Monitor for vision changes.    Anticipated Discharge Arrangements:   Home    PT/OT evals ordered?  Therapy evals ordered  Diet:  ADULT DIET; Regular; 4 carb choices (60 gm/meal); Low Fat/Low Chol/High Fiber/2 gm Na; 2000 ml  VTE prophylaxis: SCD's   Code status: Full Code      Non-peripheral Lines and Tubes (if present):      Urinary Catheter 07/25/24 (Active)        Telemetry (if present):  Cardiac/Telemetry Monitor On: Portable telemetry pack applied        Hospital Problems:  Principal Problem:    Pulmonary edema, acute (HCC)  Active Problems:    ENDY (acute kidney injury) (HCC)    DM (diabetes  aluminum & magnesium hydroxide-simethicone (MAALOX) 200-200-20 MG/5ML suspension 30 mL  30 mL Oral Q6H PRN    acetaminophen (TYLENOL) tablet 650 mg  650 mg Oral Q6H PRN    Or    acetaminophen (TYLENOL) suppository 650 mg  650 mg Rectal Q6H PRN    amLODIPine (NORVASC) tablet 10 mg  10 mg Oral Daily    hydrALAZINE (APRESOLINE) tablet 50 mg  50 mg Oral 3 times per day    torsemide (DEMADEX) tablet 60 mg  60 mg Oral Daily    tamsulosin (FLOMAX) capsule 0.4 mg  0.4 mg Oral Daily    insulin glargine (LANTUS) injection vial 3 Units  3 Units SubCUTAneous QAM    insulin lispro (HUMALOG,ADMELOG) injection vial 0-8 Units  0-8 Units SubCUTAneous TID WC    insulin lispro (HUMALOG,ADMELOG) injection vial 0-4 Units  0-4 Units SubCUTAneous Nightly    glucose chewable tablet 16 g  4 tablet Oral PRN    dextrose bolus 10% 125 mL  125 mL IntraVENous PRN    Or    dextrose bolus 10% 250 mL  250 mL IntraVENous PRN    Glucagon Emergency KIT 1 mg  1 mg SubCUTAneous PRN    dextrose 10 % infusion   IntraVENous Continuous PRN    albuterol (PROVENTIL) nebulizer solution 2.5 mg  2.5 mg Nebulization Q6H PRN       Signed:  Hira Goode DO    Part of this note may have been written by using a voice dictation software.  The note has been proof read but may still contain some grammatical/other typographical errors.

## 2024-07-26 NOTE — PROGRESS NOTES
4 Eyes Skin Assessment     NAME:  Vince Avelar  YOB: 1970  MEDICAL RECORD NUMBER:  [id number]    The patient is being assessed for  Admission    I agree that at least one RN has performed a thorough Head to Toe Skin Assessment on the patient. ALL assessment sites listed below have been assessed.      Areas assessed by both nurses:    Head, Face, Ears, Shoulders, Back, Chest, Arms, Elbows, Hands, Sacrum. Buttock, Coccyx, Ischium, Legs. Feet and Heels, and Under Medical Devices         Does the Patient have a Wound? No noted wound(s)       Frank Prevention initiated by RN: Yes  Wound Care Orders initiated by RN: No    Pressure Injury (Stage 3,4, Unstageable, DTI, NWPT, and Complex wounds) if present, place Wound referral order by RN under : No    New Ostomies, if present place, Ostomy referral order under : No     Nurse 1 eSignature: Electronically signed by Josey Up RN on 7/25/24 at 9:16 PM EDT    **SHARE this note so that the co-signing nurse can place an eSignature**    Nurse 2 eSignature: Electronically signed by Linda Lowry RN on 7/25/24 at 9:17 PM EDT

## 2024-07-26 NOTE — DIABETES MGMT
Patient admitted with acute pulmonary edema. Admitting blood glucose 137. Creatiine 4.29. Blood glucose this morning was 123. Reviewed patient current regimen: Lantus 3 units daily and Humalog correctional insulin.    Patient seen for diabetes education. Patient and son speak Malian, interpretering services utilized (Pavel #[phone]). Patient well known to diabetes management team as he was seen during last admission last week. Patient was discharged on Lantus 3 units daily. Patient and son state they were able to  insulin pens and pen needles and have been taking 3 units daily. Patient son has blood pressure and blood sugar log at bedside. Patient son has been checking patient's blood sugar 3x per day. Glucose ranging 121-188. Patient's blood pressures on one log provided by patient son: 150/79/, 143/78, 141/81, 141/77. Patient states they did  the Humalog vial and syringes as well but have not needed to use this yet. Reviewed correctional insulin. Patient states they are keeping unopened insulin in the refrigerator. Patient has connected with wellness outreach team and states he has seen a PCP. Patient compliance with medications and has pill box. Reviewed hypoglycemia signs, symptoms, and treatment. Encouraged compliance with discharge regimen. Encouraged patient to continue to work on lifestyle modifications and to follow up with primary care provider for further titration of regimen. Patient verbalized understanding and voices no further questions regarding diabetes management at this time.

## 2024-07-27 LAB
ANION GAP SERPL CALC-SCNC: 10 MMOL/L (ref 9–18)
BUN SERPL-MCNC: 56 MG/DL (ref 6–23)
CALCIUM SERPL-MCNC: 7.9 MG/DL (ref 8.8–10.2)
CHLORIDE SERPL-SCNC: 97 MMOL/L (ref 98–107)
CHOLEST SERPL-MCNC: 168 MG/DL (ref 0–200)
CO2 SERPL-SCNC: 24 MMOL/L (ref 20–28)
CREAT SERPL-MCNC: 4.23 MG/DL (ref 0.8–1.3)
FERRITIN SERPL-MCNC: 595 NG/ML (ref 8–388)
GLUCOSE BLD STRIP.AUTO-MCNC: 104 MG/DL (ref 65–100)
GLUCOSE BLD STRIP.AUTO-MCNC: 129 MG/DL (ref 65–100)
GLUCOSE BLD STRIP.AUTO-MCNC: 154 MG/DL (ref 65–100)
GLUCOSE BLD STRIP.AUTO-MCNC: 199 MG/DL (ref 65–100)
GLUCOSE SERPL-MCNC: 101 MG/DL (ref 70–99)
HCT VFR BLD AUTO: 23.7 % (ref 41.1–50.3)
HDLC SERPL-MCNC: 45 MG/DL (ref 40–60)
HDLC SERPL: 3.7 (ref 0–5)
HGB BLD-MCNC: 8 G/DL (ref 13.6–17.2)
IRON SATN MFR SERPL: 18 % (ref 20–50)
IRON SERPL-MCNC: 34 UG/DL (ref 35–100)
LDLC SERPL CALC-MCNC: 102 MG/DL (ref 0–100)
MAGNESIUM SERPL-MCNC: 2.2 MG/DL (ref 1.8–2.4)
NT PRO BNP: ABNORMAL PG/ML (ref 0–125)
PHOSPHATE SERPL-MCNC: 5.5 MG/DL (ref 2.5–4.5)
PLATELET # BLD AUTO: 375 K/UL (ref 150–450)
POTASSIUM SERPL-SCNC: 3.9 MMOL/L (ref 3.5–5.1)
SERVICE CMNT-IMP: ABNORMAL
SODIUM SERPL-SCNC: 130 MMOL/L (ref 136–145)
T4 FREE SERPL-MCNC: 1.2 NG/DL (ref 0.9–1.7)
TIBC SERPL-MCNC: 191 UG/DL (ref 240–450)
TRIGL SERPL-MCNC: 104 MG/DL (ref 0–150)
TSH W FREE THYROID IF ABNORMAL: 5.81 UIU/ML (ref 0.27–4.2)
UIBC SERPL-MCNC: 157 UG/DL (ref 112–347)
VLDLC SERPL CALC-MCNC: 21 MG/DL (ref 6–23)

## 2024-07-27 PROCEDURE — 84439 ASSAY OF FREE THYROXINE: CPT

## 2024-07-27 PROCEDURE — 97165 OT EVAL LOW COMPLEX 30 MIN: CPT

## 2024-07-27 PROCEDURE — 6370000000 HC RX 637 (ALT 250 FOR IP): Performed by: FAMILY MEDICINE

## 2024-07-27 PROCEDURE — 80061 LIPID PANEL: CPT

## 2024-07-27 PROCEDURE — 97161 PT EVAL LOW COMPLEX 20 MIN: CPT

## 2024-07-27 PROCEDURE — 84100 ASSAY OF PHOSPHORUS: CPT

## 2024-07-27 PROCEDURE — 82962 GLUCOSE BLOOD TEST: CPT

## 2024-07-27 PROCEDURE — 82728 ASSAY OF FERRITIN: CPT

## 2024-07-27 PROCEDURE — 85018 HEMOGLOBIN: CPT

## 2024-07-27 PROCEDURE — 83550 IRON BINDING TEST: CPT

## 2024-07-27 PROCEDURE — 85014 HEMATOCRIT: CPT

## 2024-07-27 PROCEDURE — 83540 ASSAY OF IRON: CPT

## 2024-07-27 PROCEDURE — 6370000000 HC RX 637 (ALT 250 FOR IP): Performed by: INTERNAL MEDICINE

## 2024-07-27 PROCEDURE — 97116 GAIT TRAINING THERAPY: CPT

## 2024-07-27 PROCEDURE — 85049 AUTOMATED PLATELET COUNT: CPT

## 2024-07-27 PROCEDURE — 83880 ASSAY OF NATRIURETIC PEPTIDE: CPT

## 2024-07-27 PROCEDURE — 84443 ASSAY THYROID STIM HORMONE: CPT

## 2024-07-27 PROCEDURE — 2580000003 HC RX 258: Performed by: FAMILY MEDICINE

## 2024-07-27 PROCEDURE — 36415 COLL VENOUS BLD VENIPUNCTURE: CPT

## 2024-07-27 PROCEDURE — 97530 THERAPEUTIC ACTIVITIES: CPT

## 2024-07-27 PROCEDURE — 80048 BASIC METABOLIC PNL TOTAL CA: CPT

## 2024-07-27 PROCEDURE — 6360000002 HC RX W HCPCS: Performed by: INTERNAL MEDICINE

## 2024-07-27 PROCEDURE — 83735 ASSAY OF MAGNESIUM: CPT

## 2024-07-27 PROCEDURE — 1100000000 HC RM PRIVATE

## 2024-07-27 RX ORDER — SENNOSIDES A AND B 8.6 MG/1
1 TABLET, FILM COATED ORAL 2 TIMES DAILY PRN
Status: DISCONTINUED | OUTPATIENT
Start: 2024-07-27 | End: 2024-07-28

## 2024-07-27 RX ORDER — LISINOPRIL 5 MG/1
2.5 TABLET ORAL DAILY
Status: DISCONTINUED | OUTPATIENT
Start: 2024-07-27 | End: 2024-07-31

## 2024-07-27 RX ADMIN — HYDRALAZINE HYDROCHLORIDE 50 MG: 50 TABLET ORAL at 05:49

## 2024-07-27 RX ADMIN — TAMSULOSIN HYDROCHLORIDE 0.4 MG: 0.4 CAPSULE ORAL at 08:40

## 2024-07-27 RX ADMIN — HYDRALAZINE HYDROCHLORIDE 50 MG: 50 TABLET ORAL at 13:22

## 2024-07-27 RX ADMIN — FINASTERIDE 5 MG: 5 TABLET, FILM COATED ORAL at 08:40

## 2024-07-27 RX ADMIN — POLYETHYLENE GLYCOL 3350 17 G: 17 POWDER, FOR SOLUTION ORAL at 08:40

## 2024-07-27 RX ADMIN — LISINOPRIL 2.5 MG: 5 TABLET ORAL at 12:24

## 2024-07-27 RX ADMIN — EPOETIN ALFA-EPBX 10000 UNITS: 10000 INJECTION, SOLUTION INTRAVENOUS; SUBCUTANEOUS at 16:58

## 2024-07-27 RX ADMIN — HYDRALAZINE HYDROCHLORIDE 50 MG: 50 TABLET ORAL at 20:40

## 2024-07-27 RX ADMIN — FOLIC ACID 1 MG: 1 TABLET ORAL at 20:40

## 2024-07-27 RX ADMIN — BUMETANIDE 0.5 MG/HR: 0.25 INJECTION INTRAMUSCULAR; INTRAVENOUS at 13:23

## 2024-07-27 RX ADMIN — AMLODIPINE BESYLATE 10 MG: 10 TABLET ORAL at 08:40

## 2024-07-27 RX ADMIN — INSULIN GLARGINE 3 UNITS: 100 INJECTION, SOLUTION SUBCUTANEOUS at 08:40

## 2024-07-27 RX ADMIN — SODIUM CHLORIDE, PRESERVATIVE FREE 10 ML: 5 INJECTION INTRAVENOUS at 20:17

## 2024-07-27 NOTE — PROGRESS NOTES
Subjective:   Daily Progress Note: 7/27/2024 10:52 AM    Feels better. .TV utilized.  Comfortable  No CP No SOB  No Abd pain  MS -      Current Facility-Administered Medications   Medication Dose Route Frequency    senna (SENOKOT) tablet 8.6 mg  1 tablet Oral BID PRN    epoetin lloyd-epbx (RETACRIT) injection 10,000 Units  10,000 Units SubCUTAneous Weekly    finasteride (PROSCAR) tablet 5 mg  5 mg Oral Daily    folic acid (FOLVITE) tablet 1 mg  1 mg Oral Daily    labetalol (NORMODYNE;TRANDATE) injection 5 mg  5 mg IntraVENous Q6H PRN    polyethylene glycol (GLYCOLAX) packet 17 g  17 g Oral Daily    sodium chloride flush 0.9 % injection 5-40 mL  5-40 mL IntraVENous 2 times per day    sodium chloride flush 0.9 % injection 5-40 mL  5-40 mL IntraVENous PRN    0.9 % sodium chloride infusion   IntraVENous PRN    polyethylene glycol (GLYCOLAX) packet 17 g  17 g Oral Daily PRN    bisacodyl (DULCOLAX) suppository 10 mg  10 mg Rectal Daily PRN    famotidine (PEPCID) tablet 10 mg  10 mg Oral Daily PRN    aluminum & magnesium hydroxide-simethicone (MAALOX) 200-200-20 MG/5ML suspension 30 mL  30 mL Oral Q6H PRN    acetaminophen (TYLENOL) tablet 650 mg  650 mg Oral Q6H PRN    Or    acetaminophen (TYLENOL) suppository 650 mg  650 mg Rectal Q6H PRN    amLODIPine (NORVASC) tablet 10 mg  10 mg Oral Daily    hydrALAZINE (APRESOLINE) tablet 50 mg  50 mg Oral 3 times per day    [Held by provider] torsemide (DEMADEX) tablet 60 mg  60 mg Oral Daily    tamsulosin (FLOMAX) capsule 0.4 mg  0.4 mg Oral Daily    insulin glargine (LANTUS) injection vial 3 Units  3 Units SubCUTAneous QAM    insulin lispro (HUMALOG,ADMELOG) injection vial 0-8 Units  0-8 Units SubCUTAneous TID WC    insulin lispro (HUMALOG,ADMELOG) injection vial 0-4 Units  0-4 Units SubCUTAneous Nightly    glucose chewable tablet 16 g  4 tablet Oral PRN    dextrose bolus 10% 125 mL  125 mL IntraVENous PRN    Or    dextrose bolus 10% 250 mL  250 mL IntraVENous PRN     Monocytes Absolute 0.5 0.1 - 1.3 K/UL    Eosinophils Absolute 0.3 0.0 - 0.8 K/UL    Basophils Absolute 0.1 0.0 - 0.2 K/UL    Immature Granulocytes Absolute 0.0 0.0 - 0.5 K/UL   POCT Glucose    Collection Time: 07/26/24  7:33 AM   Result Value Ref Range    POC Glucose 123 (H) 65 - 100 mg/dL    Performed by: JackientNenioHarmonized    POCT Glucose    Collection Time: 07/26/24 11:13 AM   Result Value Ref Range    POC Glucose 145 (H) 65 - 100 mg/dL    Performed by: MakenzieStudentNurseHarmonized    POCT Glucose    Collection Time: 07/26/24  3:30 PM   Result Value Ref Range    POC Glucose 129 (H) 65 - 100 mg/dL    Performed by: MakenzieStkalyanntNurseHarmonized    POCT Glucose    Collection Time: 07/26/24  8:38 PM   Result Value Ref Range    POC Glucose 125 (H) 65 - 100 mg/dL    Performed by: Venessa    Basic Metabolic Panel w/ Reflex to MG    Collection Time: 07/27/24  5:30 AM   Result Value Ref Range    Sodium 130 (L) 136 - 145 mmol/L    Potassium 3.9 3.5 - 5.1 mmol/L    Chloride 97 (L) 98 - 107 mmol/L    CO2 24 20 - 28 mmol/L    Anion Gap 10 9 - 18 mmol/L    Glucose 101 (H) 70 - 99 mg/dL    BUN 56 (H) 6 - 23 MG/DL    Creatinine 4.23 (H) 0.80 - 1.30 MG/DL    Est, Glom Filt Rate 16 (L) >60 ml/min/1.73m2    Calcium 7.9 (L) 8.8 - 10.2 MG/DL   Lipid Panel    Collection Time: 07/27/24  5:30 AM   Result Value Ref Range    Cholesterol, Total 168 0 - 200 MG/DL    Triglycerides 104 0 - 150 MG/DL    HDL 45 40 - 60 MG/DL    LDL Cholesterol 102 (H) 0 - 100 MG/DL    VLDL Cholesterol Calculated 21 6 - 23 MG/DL    Chol/HDL Ratio 3.7 0.0 - 5.0     Magnesium    Collection Time: 07/27/24  5:30 AM   Result Value Ref Range    Magnesium 2.2 1.8 - 2.4 mg/dL   Phosphorus    Collection Time: 07/27/24  5:30 AM   Result Value Ref Range    Phosphorus 5.5 (H) 2.5 - 4.5 MG/DL   Hemoglobin and Hematocrit    Collection Time: 07/27/24  5:30 AM   Result Value Ref Range    Hemoglobin 8.0 (L) 13.6 - 17.2 g/dL    Hematocrit 23.7

## 2024-07-27 NOTE — THERAPY EVALUATION
ACUTE PHYSICAL THERAPY GOALS:   (Developed with and agreed upon by patient and/or caregiver.)    (1.) Vince Avelar  will move from supine to sit and sit to supine  and scoot up and down with INDEPENDENCE within 7 treatment day(s).    (2.) Vince Avelar will transfer from bed to chair and chair to bed with MODIFIED INDEPENDENCE using the least restrictive device within 7 treatment day(s).    (3.) Vince Avelar will ambulate with MODIFIED INDEPENDENCE for 500 feet with the least restrictive device within 7 treatment day(s).   (4.) Vince Avelar will perform standing static and dynamic balance activities x 10 minutes with MODIFIED INDEPENDENCE to improve safety within 7 treatment day(s).  (5.) Vince Avelar will perform therapeutic exercises x 15 min for HEP with INDEPENDENCE to improve strength, endurance, and functional mobility within 7 treatment day(s).       PHYSICAL THERAPY Initial Assessment, Daily Note, and AM  (Link to Caseload Tracking: PT Visit Days : 1  Acknowledge Orders  Time In/Out  PT Charge Capture  Rehab Caseload Tracker    Vince Avelar is a 53 y.o. male   PRIMARY DIAGNOSIS: Pulmonary edema, acute (HCC)  Pulmonary edema, acute (HCC) [J81.0]       Reason for Referral: Generalized Muscle Weakness (M62.81)  Difficulty in walking, Not elsewhere classified (R26.2)  Inpatient: Payor: /     ASSESSMENT:     REHAB RECOMMENDATIONS:   Recommendation to date pending progress:  Setting:  Home Health Therapy    Equipment:    Rolling Walker     ASSESSMENT:  Mr. Priti Avelar is a 53 year old male who presents to [facility] with c/o SOB and worsening edema. He is diagnosed with pulmonary edema and was recently hospitalized with same, discharging on 7/19/24. At baseline, he reports being modified independent with a cane. This date pt performs mobility including bed mobility with max A x 2, transfers with RW

## 2024-07-27 NOTE — PROGRESS NOTES
Hospitalist Progress Note   Admit Date:  2024  3:37 PM   Name:  Vince Avelar   Age:  53 y.o.  Sex:  male  :  1970   MRN:  [id number]   Room:  [facility]/    Presenting/Chief Complaint: Shortness of Breath     Reason(s) for Admission: Pulmonary edema, acute (HCC) [J81.0]     Hospital Course:   Vince Avelar is a 53 y.o. male with medical history of HTN, T2DM, anemia, BPH with urinary retention who presented with SOB.    Vince initially presented to the emergency department referred by his primary care physician for progressive dyspnea, hypoxia and lower extremity swelling.  He was recently admitted on 07/10 where he was found to have new onset renal failure with volume overload ultimately requiring torsemide.  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  Additionally he required a Oliver catheter at discharge for urinary retention.  Per the initial H&P, patient was not compliant with his home torsemide.  In the ER, TM 98.1 °F, RR 20, HR 88, /70, SpO2 92% on room air.  Labs remarkable for Na+ 130, CO2 24, Glu 120, Cr 4.29 (recent baseline 4.61), and hemoglobin of 7.6 (recent baseline 7.7).  CXR demonstrated ill-defined opacities in the bilateral lung bases likely representing pleural effusions and atelectasis with prominence of interstitial markings and central pulmonary vasculature likely representing pulmonary edema.  Cardiomegaly was present as well.  Hospital medicine team was consulted for admission.    Subjective & 24hr Events:   Interview was ducted with a trained .  No acute events overnight.  Patient was evaluated bedside.  Reviewed HPI as outlined above.  States he went home and gradually developed shortness of breath and lower extremity swelling. LE swelling has improved but dyspnea at rest not substantially changed. Fair UOP. Son at bedside, questions  injection 10,000 Units  10,000 Units SubCUTAneous Weekly    lisinopril (PRINIVIL;ZESTRIL) tablet 2.5 mg  2.5 mg Oral Daily    bumetanide (BUMEX) 12.5 mg in 50 mL infusion  0.5 mg/hr IntraVENous Continuous    finasteride (PROSCAR) tablet 5 mg  5 mg Oral Daily    folic acid (FOLVITE) tablet 1 mg  1 mg Oral Daily    labetalol (NORMODYNE;TRANDATE) injection 5 mg  5 mg IntraVENous Q6H PRN    polyethylene glycol (GLYCOLAX) packet 17 g  17 g Oral Daily    sodium chloride flush 0.9 % injection 5-40 mL  5-40 mL IntraVENous 2 times per day    sodium chloride flush 0.9 % injection 5-40 mL  5-40 mL IntraVENous PRN    0.9 % sodium chloride infusion   IntraVENous PRN    polyethylene glycol (GLYCOLAX) packet 17 g  17 g Oral Daily PRN    bisacodyl (DULCOLAX) suppository 10 mg  10 mg Rectal Daily PRN    famotidine (PEPCID) tablet 10 mg  10 mg Oral Daily PRN    aluminum & magnesium hydroxide-simethicone (MAALOX) 200-200-20 MG/5ML suspension 30 mL  30 mL Oral Q6H PRN    acetaminophen (TYLENOL) tablet 650 mg  650 mg Oral Q6H PRN    Or    acetaminophen (TYLENOL) suppository 650 mg  650 mg Rectal Q6H PRN    amLODIPine (NORVASC) tablet 10 mg  10 mg Oral Daily    hydrALAZINE (APRESOLINE) tablet 50 mg  50 mg Oral 3 times per day    [Held by provider] torsemide (DEMADEX) tablet 60 mg  60 mg Oral Daily    tamsulosin (FLOMAX) capsule 0.4 mg  0.4 mg Oral Daily    insulin glargine (LANTUS) injection vial 3 Units  3 Units SubCUTAneous QAM    insulin lispro (HUMALOG,ADMELOG) injection vial 0-8 Units  0-8 Units SubCUTAneous TID WC    insulin lispro (HUMALOG,ADMELOG) injection vial 0-4 Units  0-4 Units SubCUTAneous Nightly    glucose chewable tablet 16 g  4 tablet Oral PRN    dextrose bolus 10% 125 mL  125 mL IntraVENous PRN    Or    dextrose bolus 10% 250 mL  250 mL IntraVENous PRN    Glucagon Emergency KIT 1 mg  1 mg SubCUTAneous PRN    dextrose 10 % infusion   IntraVENous Continuous PRN    albuterol (PROVENTIL) nebulizer solution 2.5 mg  2.5 mg

## 2024-07-27 NOTE — PROGRESS NOTES
Pt had a good day and did not complain of pain. Pt was in chair for most of shift. Lisinopril was added daily and retacrit was added weekly. Bumex was re-ordered and now at 2ml/hr. Son at bedside. See kidney function results below.     Latest Reference Range & Units 07/26/24 05:47 07/27/24 05:30   BUN,BUNPL 6 - 23 MG/DL 57 (H) 56 (H)   Creatinine 0.80 - 1.30 MG/DL 4.29 (H) 4.23 (H)   (H): Data is abnormally high    All known needs met at this time. Bed is currently low, call light was left in reach, and pt was encouraged to ask for assistance. Pt was left resting in bed with no complaints. Will give bedside report to oncoming nurse.

## 2024-07-27 NOTE — PROGRESS NOTES
ACUTE OCCUPATIONAL THERAPY GOALS:   (Developed with and agreed upon by patient and/or caregiver.)  1. Patient will complete lower body bathing and dressing with MOD I and adaptive equipment as needed.   2.Patient will complete upper body bathing and dressing with MOD I and adaptive equipment as needed.  3. Patient will complete toileting with MOD I.   4. Patient will tolerate 30 minutes of OT treatment with 1-2 rest breaks to increase activity tolerance for ADLs.   5. Patient will complete functional transfers with MOD I and adaptive equipment as needed.   6. Patient will complete functional activity with MOD I and adaptive equipment as needed.    Timeframe: 7 visits      OCCUPATIONAL THERAPY Initial Assessment and Daily Note       OT Visit Days: 1  Acknowledge Orders  Time  OT Charge Capture  Rehab Caseload Tracker      Vince Avelar is a 53 y.o. male   PRIMARY DIAGNOSIS: Pulmonary edema, acute (HCC)  Pulmonary edema, acute (HCC) [J81.0]       Reason for Referral: Generalized Muscle Weakness (M62.81)  Other lack of cordination (R27.8)  Difficulty in walking, Not elsewhere classified (R26.2)  Inpatient: Payor: /     ASSESSMENT:     REHAB RECOMMENDATIONS:   Recommendation to date pending progress:  Setting:  Home Health Therapy    Equipment:    Rolling Walker     ASSESSMENT:  Mr. Priti Avelar presented to the hospital with SOB and worsening edema. At baseline, pt lives with his family and is mod I for SPC. Pt recently admitted and discharged on 7/19. Pt's family has been assisting him since discharge.  Today, pt was received supine in bed. Completed bed mobility with maxA x2. Sat edge of bed with CGA. Sit>stand modA x2. Ambulated with rolling walker with CGA. Pt limited by swollen scrotum and pain from it. O2 sats dropped to mid 80s with mobility on room air but pt recovered to the 90s with rest (RN notified). Anticipate pt will progress well functionally. Recommend pt return home with continued  Assistance, CGA=Contact Guard Assistance, Min=Minimal Assistance, Mod=Moderate Assistance, Max=Maximal Assistance, Total=Total Assistance, NT=Not Tested    PLAN:   FREQUENCY/DURATION   OT Plan of Care: 3 times/week for duration of hospital stay or until stated goals are met, whichever comes first.    PROBLEM LIST:   (Skilled intervention is medically necessary to address:)  Decreased ADL/Functional Activities  Decreased Activity Tolerance  Decreased Balance  Decreased Coordination  Decreased Safety Awareness  Decreased Strength  Decreased Transfer Abilities  Increased Pain   INTERVENTIONS PLANNED:  (Benefits and precautions of occupational therapy have been discussed with the patient.)  Self Care Training  Therapeutic Activity  Therapeutic Exercise/HEP  Neuromuscular Re-education  Manual Therapy  Education         TREATMENT:     EVALUATION: LOW COMPLEXITY: (Untimed Charge)  The initial evaluation charge encompasses clinical chart review, objective assessment, interpretation of assessment, and skilled monitoring of the patient's response to treatment in order to develop a plan of care.     TREATMENT:   Co-Treatment PT/OT necessary due to patient's decreased overall endurance/tolerance levels, as well as need for high level skilled assistance to complete functional transfers/mobility and functional tasks  Therapeutic Activity (20 Minutes): Patient participated in therapeutic activities including bed mobility training, functional transfer training, functional mobility of household distances, sitting tolerance activity, and standing tolerance activity with minimal verbal cues and tactile cues in order to increase independence, increase safety awareness, and increase activity tolerance.     TREATMENT GRID:  N/A    AFTER TREATMENT PRECAUTIONS: Call light within reach, Chair, Needs within reach, RN notified, and Visitors at bedside    INTERDISCIPLINARY COLLABORATION:  RN/ PCT, PT/ PTA, and OT/ BEE    EDUCATION:  Education

## 2024-07-28 PROBLEM — E78.5 HYPERLIPIDEMIA: Status: ACTIVE | Noted: 2024-07-28

## 2024-07-28 PROBLEM — K59.00 CONSTIPATION: Status: ACTIVE | Noted: 2024-07-28

## 2024-07-28 LAB
ANION GAP SERPL CALC-SCNC: 11 MMOL/L (ref 9–18)
BUN SERPL-MCNC: 59 MG/DL (ref 6–23)
CALCIUM SERPL-MCNC: 8 MG/DL (ref 8.8–10.2)
CHLORIDE SERPL-SCNC: 94 MMOL/L (ref 98–107)
CO2 SERPL-SCNC: 24 MMOL/L (ref 20–28)
CREAT SERPL-MCNC: 4.29 MG/DL (ref 0.8–1.3)
GLUCOSE BLD STRIP.AUTO-MCNC: 145 MG/DL (ref 65–100)
GLUCOSE BLD STRIP.AUTO-MCNC: 165 MG/DL (ref 65–100)
GLUCOSE BLD STRIP.AUTO-MCNC: 170 MG/DL (ref 65–100)
GLUCOSE BLD STRIP.AUTO-MCNC: 186 MG/DL (ref 65–100)
GLUCOSE SERPL-MCNC: 149 MG/DL (ref 70–99)
HCT VFR BLD AUTO: 26.8 % (ref 41.1–50.3)
HGB BLD-MCNC: 8.9 G/DL (ref 13.6–17.2)
MAGNESIUM SERPL-MCNC: 2.1 MG/DL (ref 1.8–2.4)
PHOSPHATE SERPL-MCNC: 5.7 MG/DL (ref 2.5–4.5)
PLATELET # BLD AUTO: 414 K/UL (ref 150–450)
POTASSIUM SERPL-SCNC: 4 MMOL/L (ref 3.5–5.1)
SERVICE CMNT-IMP: ABNORMAL
SODIUM SERPL-SCNC: 129 MMOL/L (ref 136–145)

## 2024-07-28 PROCEDURE — 6370000000 HC RX 637 (ALT 250 FOR IP): Performed by: INTERNAL MEDICINE

## 2024-07-28 PROCEDURE — 1100000000 HC RM PRIVATE

## 2024-07-28 PROCEDURE — 82962 GLUCOSE BLOOD TEST: CPT

## 2024-07-28 PROCEDURE — 84100 ASSAY OF PHOSPHORUS: CPT

## 2024-07-28 PROCEDURE — 80048 BASIC METABOLIC PNL TOTAL CA: CPT

## 2024-07-28 PROCEDURE — 85014 HEMATOCRIT: CPT

## 2024-07-28 PROCEDURE — 6370000000 HC RX 637 (ALT 250 FOR IP): Performed by: FAMILY MEDICINE

## 2024-07-28 PROCEDURE — 6360000002 HC RX W HCPCS: Performed by: INTERNAL MEDICINE

## 2024-07-28 PROCEDURE — 2580000003 HC RX 258: Performed by: FAMILY MEDICINE

## 2024-07-28 PROCEDURE — 36415 COLL VENOUS BLD VENIPUNCTURE: CPT

## 2024-07-28 PROCEDURE — 2580000003 HC RX 258: Performed by: INTERNAL MEDICINE

## 2024-07-28 PROCEDURE — 83735 ASSAY OF MAGNESIUM: CPT

## 2024-07-28 PROCEDURE — 85018 HEMOGLOBIN: CPT

## 2024-07-28 PROCEDURE — 85049 AUTOMATED PLATELET COUNT: CPT

## 2024-07-28 RX ORDER — INSULIN GLARGINE 100 [IU]/ML
4 INJECTION, SOLUTION SUBCUTANEOUS EVERY MORNING
Status: DISCONTINUED | OUTPATIENT
Start: 2024-07-29 | End: 2024-07-31 | Stop reason: HOSPADM

## 2024-07-28 RX ORDER — POLYETHYLENE GLYCOL 3350 17 G/17G
17 POWDER, FOR SOLUTION ORAL 2 TIMES DAILY
Status: DISCONTINUED | OUTPATIENT
Start: 2024-07-28 | End: 2024-07-31 | Stop reason: HOSPADM

## 2024-07-28 RX ORDER — SENNA AND DOCUSATE SODIUM 50; 8.6 MG/1; MG/1
2 TABLET, FILM COATED ORAL 2 TIMES DAILY PRN
Status: DISCONTINUED | OUTPATIENT
Start: 2024-07-28 | End: 2024-07-28

## 2024-07-28 RX ORDER — DOCUSATE SODIUM 100 MG/1
200 CAPSULE, LIQUID FILLED ORAL DAILY
Status: DISCONTINUED | OUTPATIENT
Start: 2024-07-28 | End: 2024-07-31 | Stop reason: HOSPADM

## 2024-07-28 RX ORDER — SENNOSIDES A AND B 8.6 MG/1
1 TABLET, FILM COATED ORAL 2 TIMES DAILY
Status: DISCONTINUED | OUTPATIENT
Start: 2024-07-28 | End: 2024-07-31 | Stop reason: HOSPADM

## 2024-07-28 RX ORDER — ATORVASTATIN CALCIUM 40 MG/1
40 TABLET, FILM COATED ORAL NIGHTLY
Status: DISCONTINUED | OUTPATIENT
Start: 2024-07-28 | End: 2024-07-31 | Stop reason: HOSPADM

## 2024-07-28 RX ADMIN — HYDRALAZINE HYDROCHLORIDE 50 MG: 50 TABLET ORAL at 21:43

## 2024-07-28 RX ADMIN — LISINOPRIL 2.5 MG: 5 TABLET ORAL at 08:53

## 2024-07-28 RX ADMIN — DOCUSATE SODIUM 50 MG AND SENNOSIDES 8.6 MG 2 TABLET: 8.6; 5 TABLET, FILM COATED ORAL at 05:02

## 2024-07-28 RX ADMIN — DOCUSATE SODIUM 200 MG: 100 CAPSULE, LIQUID FILLED ORAL at 21:42

## 2024-07-28 RX ADMIN — SODIUM CHLORIDE, PRESERVATIVE FREE 10 ML: 5 INJECTION INTRAVENOUS at 08:53

## 2024-07-28 RX ADMIN — TAMSULOSIN HYDROCHLORIDE 0.4 MG: 0.4 CAPSULE ORAL at 08:53

## 2024-07-28 RX ADMIN — HYDRALAZINE HYDROCHLORIDE 50 MG: 50 TABLET ORAL at 05:02

## 2024-07-28 RX ADMIN — HYDRALAZINE HYDROCHLORIDE 50 MG: 50 TABLET ORAL at 17:02

## 2024-07-28 RX ADMIN — AMLODIPINE BESYLATE 10 MG: 10 TABLET ORAL at 08:53

## 2024-07-28 RX ADMIN — SODIUM CHLORIDE, PRESERVATIVE FREE 10 ML: 5 INJECTION INTRAVENOUS at 21:48

## 2024-07-28 RX ADMIN — ATORVASTATIN CALCIUM 40 MG: 40 TABLET, FILM COATED ORAL at 21:43

## 2024-07-28 RX ADMIN — FINASTERIDE 5 MG: 5 TABLET, FILM COATED ORAL at 08:53

## 2024-07-28 RX ADMIN — SENNOSIDES 8.6 MG: 8.6 TABLET, FILM COATED ORAL at 21:42

## 2024-07-28 RX ADMIN — INSULIN GLARGINE 3 UNITS: 100 INJECTION, SOLUTION SUBCUTANEOUS at 08:52

## 2024-07-28 RX ADMIN — FOLIC ACID 1 MG: 1 TABLET ORAL at 21:43

## 2024-07-28 RX ADMIN — SODIUM CHLORIDE 125 MG: 9 INJECTION, SOLUTION INTRAVENOUS at 12:23

## 2024-07-28 RX ADMIN — POLYETHYLENE GLYCOL 3350 17 G: 17 POWDER, FOR SOLUTION ORAL at 21:44

## 2024-07-28 RX ADMIN — POLYETHYLENE GLYCOL 3350 17 G: 17 POWDER, FOR SOLUTION ORAL at 08:53

## 2024-07-28 NOTE — PLAN OF CARE
Problem: Discharge Planning  Goal: Discharge to home or other facility with appropriate resources  7/27/2024 2026 by Angélica Hooks RN  Outcome: Progressing  Flowsheets (Taken 7/27/2024 1907)  Discharge to home or other facility with appropriate resources:   Identify barriers to discharge with patient and caregiver   Arrange for needed discharge resources and transportation as appropriate   Identify discharge learning needs (meds, wound care, etc)   Arrange for interpreters to assist at discharge as needed   Refer to discharge planning if patient needs post-hospital services based on physician order or complex needs related to functional status, cognitive ability or social support system  7/27/2024 1033 by Ana Chavez RN  Outcome: Progressing     Problem: Pain  Goal: Verbalizes/displays adequate comfort level or baseline comfort level  7/27/2024 2026 by Angélica Hooks RN  Outcome: Progressing  Flowsheets (Taken 7/27/2024 1907)  Verbalizes/displays adequate comfort level or baseline comfort level: Encourage patient to monitor pain and request assistance  7/27/2024 1033 by Ana Chavez RN  Outcome: Progressing     Problem: Safety - Adult  Goal: Free from fall injury  7/27/2024 2026 by Angélica Hooks RN  Outcome: Progressing  Flowsheets (Taken 7/27/2024 1907)  Free From Fall Injury: Instruct family/caregiver on patient safety  7/27/2024 1033 by Ana Chavez RN  Outcome: Progressing     Problem: ABCDS Injury Assessment  Goal: Absence of physical injury  7/27/2024 2026 by Angélica Hooks RN  Outcome: Progressing  Flowsheets (Taken 7/27/2024 1907)  Absence of Physical Injury: Implement safety measures based on patient assessment  7/27/2024 1033 by Ana Chavez RN  Outcome: Progressing     Problem: Chronic Conditions and Co-morbidities  Goal: Patient's chronic conditions and co-morbidity symptoms are monitored and maintained or improved  7/27/2024 2026 by Angélica Hooks RN  Outcome:

## 2024-07-28 NOTE — PROGRESS NOTES
Subjective:   Daily Progress Note: 7/28/2024 11:11 AM    Feels better. .TV utilized.    Comfortable  No CP No SOB  No Abd pain  MS -      Current Facility-Administered Medications   Medication Dose Route Frequency    sennosides-docusate sodium (SENOKOT-S) 8.6-50 MG tablet 2 tablet  2 tablet Oral BID PRN    epoetin lloyd-epbx (RETACRIT) injection 10,000 Units  10,000 Units SubCUTAneous Weekly    lisinopril (PRINIVIL;ZESTRIL) tablet 2.5 mg  2.5 mg Oral Daily    bumetanide (BUMEX) 12.5 mg in 50 mL infusion  0.5 mg/hr IntraVENous Continuous    finasteride (PROSCAR) tablet 5 mg  5 mg Oral Daily    folic acid (FOLVITE) tablet 1 mg  1 mg Oral Daily    labetalol (NORMODYNE;TRANDATE) injection 5 mg  5 mg IntraVENous Q6H PRN    polyethylene glycol (GLYCOLAX) packet 17 g  17 g Oral Daily    sodium chloride flush 0.9 % injection 5-40 mL  5-40 mL IntraVENous 2 times per day    sodium chloride flush 0.9 % injection 5-40 mL  5-40 mL IntraVENous PRN    0.9 % sodium chloride infusion   IntraVENous PRN    polyethylene glycol (GLYCOLAX) packet 17 g  17 g Oral Daily PRN    bisacodyl (DULCOLAX) suppository 10 mg  10 mg Rectal Daily PRN    famotidine (PEPCID) tablet 10 mg  10 mg Oral Daily PRN    aluminum & magnesium hydroxide-simethicone (MAALOX) 200-200-20 MG/5ML suspension 30 mL  30 mL Oral Q6H PRN    acetaminophen (TYLENOL) tablet 650 mg  650 mg Oral Q6H PRN    Or    acetaminophen (TYLENOL) suppository 650 mg  650 mg Rectal Q6H PRN    amLODIPine (NORVASC) tablet 10 mg  10 mg Oral Daily    hydrALAZINE (APRESOLINE) tablet 50 mg  50 mg Oral 3 times per day    [Held by provider] torsemide (DEMADEX) tablet 60 mg  60 mg Oral Daily    tamsulosin (FLOMAX) capsule 0.4 mg  0.4 mg Oral Daily    insulin glargine (LANTUS) injection vial 3 Units  3 Units SubCUTAneous QAM    insulin lispro (HUMALOG,ADMELOG) injection vial 0-8 Units  0-8 Units SubCUTAneous TID WC    insulin lispro (HUMALOG,ADMELOG) injection vial 0-4 Units  0-4 Units

## 2024-07-28 NOTE — PROGRESS NOTES
Value Ref Range    Ferritin 595 (H) 8 - 388 NG/ML   Iron and TIBC    Collection Time: 07/27/24  5:30 AM   Result Value Ref Range    Iron 34 (L) 35 - 100 ug/dL    TIBC 191 (L) 240 - 450 ug/dL    Iron % Saturation 18 (L) 20 - 50 %    UIBC 157.0 112.0 - 347.0 ug/dL   POCT Glucose    Collection Time: 07/27/24  7:17 AM   Result Value Ref Range    POC Glucose 104 (H) 65 - 100 mg/dL    Performed by: [facility]    POCT Glucose    Collection Time: 07/27/24 11:34 AM   Result Value Ref Range    POC Glucose 129 (H) 65 - 100 mg/dL    Performed by: [facility]    POCT Glucose    Collection Time: 07/27/24  4:05 PM   Result Value Ref Range    POC Glucose 154 (H) 65 - 100 mg/dL    Performed by: [facility]    POCT Glucose    Collection Time: 07/27/24  7:02 PM   Result Value Ref Range    POC Glucose 199 (H) 65 - 100 mg/dL    [facility]formed by: [facility]    Basic Metabolic Panel w/ Reflex to MG    Collection Time: 07/28/24  5:14 AM   Result Value Ref Range    Sodium 129 (L) 136 - 145 mmol/L    Potassium 4.0 3.5 - 5.1 mmol/L    Chloride 94 (L) 98 - 107 mmol/L    CO2 24 20 - 28 mmol/L    Anion Gap 11 9 - 18 mmol/L    Glucose 149 (H) 70 - 99 mg/dL    BUN 59 (H) 6 - 23 MG/DL    Creatinine 4.29 (H) 0.80 - 1.30 MG/DL    Est, Glom Filt Rate 15 (L) >60 ml/min/1.73m2    Calcium 8.0 (L) 8.8 - 10.2 MG/DL   Magnesium    Collection Time: 07/28/24  5:14 AM   Result Value Ref Range    Magnesium 2.1 1.8 - 2.4 mg/dL   Phosphorus    Collection Time: 07/28/24  5:14 AM   Result Value Ref Range    Phosphorus 5.7 (H) 2.5 - 4.5 MG/DL   Hemoglobin and Hematocrit    Collection Time: 07/28/24  5:14 AM   Result Value Ref Range    Hemoglobin 8.9 (L) 13.6 - 17.2 g/dL    Hematocrit 26.8 (L) 41.1 - 50.3 %   Platelet Count    Collection Time: 07/28/24  5:14 AM   Result Value Ref Range    Platelets 414 150 - 450 K/uL   POCT Glucose    Collection Time: 07/28/24  7:18 AM   Result Value Ref Range    POC Glucose 145 (H) 65 - 100 mg/dL    Performed by:  torsemide (DEMADEX) tablet 60 mg  60 mg Oral Daily    tamsulosin (FLOMAX) capsule 0.4 mg  0.4 mg Oral Daily    insulin lispro (HUMALOG,ADMELOG) injection vial 0-8 Units  0-8 Units SubCUTAneous TID WC    insulin lispro (HUMALOG,ADMELOG) injection vial 0-4 Units  0-4 Units SubCUTAneous Nightly    glucose chewable tablet 16 g  4 tablet Oral PRN    dextrose bolus 10% 125 mL  125 mL IntraVENous PRN    Or    dextrose bolus 10% 250 mL  250 mL IntraVENous PRN    Glucagon Emergency KIT 1 mg  1 mg SubCUTAneous PRN    dextrose 10 % infusion   IntraVENous Continuous PRN    albuterol (PROVENTIL) nebulizer solution 2.5 mg  2.5 mg Nebulization Q6H PRN       Signed:  Hira Goode DO    Part of this note may have been written by using a voice dictation software.  The note has been proof read but may still contain some grammatical/other typographical errors.

## 2024-07-29 LAB
ANION GAP SERPL CALC-SCNC: 9 MMOL/L (ref 9–18)
BUN SERPL-MCNC: 60 MG/DL (ref 6–23)
CALCIUM SERPL-MCNC: 7.7 MG/DL (ref 8.8–10.2)
CHLORIDE SERPL-SCNC: 97 MMOL/L (ref 98–107)
CO2 SERPL-SCNC: 26 MMOL/L (ref 20–28)
CREAT SERPL-MCNC: 4.3 MG/DL (ref 0.8–1.3)
GLUCOSE BLD STRIP.AUTO-MCNC: 141 MG/DL (ref 65–100)
GLUCOSE BLD STRIP.AUTO-MCNC: 152 MG/DL (ref 65–100)
GLUCOSE BLD STRIP.AUTO-MCNC: 170 MG/DL (ref 65–100)
GLUCOSE BLD STRIP.AUTO-MCNC: 179 MG/DL (ref 65–100)
GLUCOSE SERPL-MCNC: 145 MG/DL (ref 70–99)
HCT VFR BLD AUTO: 25.4 % (ref 41.1–50.3)
HGB BLD-MCNC: 8.7 G/DL (ref 13.6–17.2)
MAGNESIUM SERPL-MCNC: 2.1 MG/DL (ref 1.8–2.4)
PHOSPHATE SERPL-MCNC: 6.3 MG/DL (ref 2.5–4.5)
PLATELET # BLD AUTO: 377 K/UL (ref 150–450)
POTASSIUM SERPL-SCNC: 3.9 MMOL/L (ref 3.5–5.1)
SERVICE CMNT-IMP: ABNORMAL
SODIUM SERPL-SCNC: 131 MMOL/L (ref 136–145)

## 2024-07-29 PROCEDURE — 6370000000 HC RX 637 (ALT 250 FOR IP): Performed by: FAMILY MEDICINE

## 2024-07-29 PROCEDURE — 84100 ASSAY OF PHOSPHORUS: CPT

## 2024-07-29 PROCEDURE — 80048 BASIC METABOLIC PNL TOTAL CA: CPT

## 2024-07-29 PROCEDURE — 82962 GLUCOSE BLOOD TEST: CPT

## 2024-07-29 PROCEDURE — 85014 HEMATOCRIT: CPT

## 2024-07-29 PROCEDURE — 6360000002 HC RX W HCPCS: Performed by: NURSE PRACTITIONER

## 2024-07-29 PROCEDURE — 2580000003 HC RX 258: Performed by: FAMILY MEDICINE

## 2024-07-29 PROCEDURE — 85049 AUTOMATED PLATELET COUNT: CPT

## 2024-07-29 PROCEDURE — 36415 COLL VENOUS BLD VENIPUNCTURE: CPT

## 2024-07-29 PROCEDURE — 85018 HEMOGLOBIN: CPT

## 2024-07-29 PROCEDURE — 97530 THERAPEUTIC ACTIVITIES: CPT

## 2024-07-29 PROCEDURE — 2580000003 HC RX 258: Performed by: NURSE PRACTITIONER

## 2024-07-29 PROCEDURE — 6370000000 HC RX 637 (ALT 250 FOR IP): Performed by: INTERNAL MEDICINE

## 2024-07-29 PROCEDURE — 1100000000 HC RM PRIVATE

## 2024-07-29 PROCEDURE — 83735 ASSAY OF MAGNESIUM: CPT

## 2024-07-29 PROCEDURE — 6360000002 HC RX W HCPCS: Performed by: INTERNAL MEDICINE

## 2024-07-29 RX ADMIN — POLYETHYLENE GLYCOL 3350 17 G: 17 POWDER, FOR SOLUTION ORAL at 08:18

## 2024-07-29 RX ADMIN — LISINOPRIL 2.5 MG: 5 TABLET ORAL at 08:17

## 2024-07-29 RX ADMIN — DOCUSATE SODIUM 200 MG: 100 CAPSULE, LIQUID FILLED ORAL at 08:17

## 2024-07-29 RX ADMIN — INSULIN GLARGINE 4 UNITS: 100 INJECTION, SOLUTION SUBCUTANEOUS at 08:18

## 2024-07-29 RX ADMIN — SODIUM CHLORIDE, PRESERVATIVE FREE 10 ML: 5 INJECTION INTRAVENOUS at 21:00

## 2024-07-29 RX ADMIN — FINASTERIDE 5 MG: 5 TABLET, FILM COATED ORAL at 08:17

## 2024-07-29 RX ADMIN — HYDRALAZINE HYDROCHLORIDE 50 MG: 50 TABLET ORAL at 06:16

## 2024-07-29 RX ADMIN — POLYETHYLENE GLYCOL 3350 17 G: 17 POWDER, FOR SOLUTION ORAL at 20:59

## 2024-07-29 RX ADMIN — SENNOSIDES 8.6 MG: 8.6 TABLET, FILM COATED ORAL at 20:59

## 2024-07-29 RX ADMIN — FOLIC ACID 1 MG: 1 TABLET ORAL at 20:59

## 2024-07-29 RX ADMIN — BUMETANIDE 0.5 MG/HR: 0.25 INJECTION INTRAMUSCULAR; INTRAVENOUS at 11:55

## 2024-07-29 RX ADMIN — AMLODIPINE BESYLATE 10 MG: 10 TABLET ORAL at 08:17

## 2024-07-29 RX ADMIN — HYDRALAZINE HYDROCHLORIDE 50 MG: 50 TABLET ORAL at 20:59

## 2024-07-29 RX ADMIN — TAMSULOSIN HYDROCHLORIDE 0.4 MG: 0.4 CAPSULE ORAL at 08:17

## 2024-07-29 RX ADMIN — SODIUM CHLORIDE 125 MG: 9 INJECTION, SOLUTION INTRAVENOUS at 11:46

## 2024-07-29 RX ADMIN — HYDRALAZINE HYDROCHLORIDE 50 MG: 50 TABLET ORAL at 14:38

## 2024-07-29 RX ADMIN — SENNOSIDES 8.6 MG: 8.6 TABLET, FILM COATED ORAL at 08:17

## 2024-07-29 RX ADMIN — ATORVASTATIN CALCIUM 40 MG: 40 TABLET, FILM COATED ORAL at 20:59

## 2024-07-29 NOTE — PROGRESS NOTES
Pt did not complain of pain this shift and slept intermittently throughout shift. Pt had one bag of ferric gluconate. Pt's vital signs were changed to Q4 hours d/t bumex drip. Continuous telemetry was re-ordered d/t bumex drip as well.     All known needs met at this time. Bed is currently low, call light was left in reach, and pt was encouraged to ask for assistance. Pt was left resting in bed with no complaints. Will give bedside report to oncoming nurse.

## 2024-07-29 NOTE — PROGRESS NOTES
/Cultural Navigator rounded in person and assessed patient's language needs.  Patient stated that communication was satisfactory via  services. An opportunity for questions and clarifications were provided. Language services interpreting resources were offered as needed.       Thank you,          Gregoria BERGERON  Senior /Navigator   Language Services Department  [phone] (phone)

## 2024-07-29 NOTE — DIABETES MGMT
Patient admitted with acute pulmonary edema. Blood glucose ranged 145-186 yesterday with patient receiving Lantus 3 units. Blood glucose this morning was 141. Creatinine 4.30. GFR 15. Reviewed patient current regimen: Lantus 4 units daily and Humalog correctional insulin. Glycemic control overall stable on current regimen, will follow along loosely.

## 2024-07-29 NOTE — PROGRESS NOTES
Patient/caregivers speak [place]  as their preferred language for their healthcare communication. For safe communication, use the [facility]  carts or call:    Senior /Navigator Gregoria Carr at [phone] or   [employer] phone services for [facility] at [phone]    General phone: [phone] ( [phone])  Email: [email or web address]    Always document the use of interpreting services ('s ID number) in your clinical notes.    Our interpreters are available for team members working with limited  English proficient (LEP) patients remotely, via phone or video or in person (if needed for special cases).    When using family members to interpret, for the safety of the patient and protection of the communication of both our patient and [facility] staff the VRI or telephonic  should remain on the line to monitor that all communication is accurate and complete. The  should be instructed to notify [facility] staff immediately if there are any inaccuracies.         Thank you,        Gregoria BERGERON  Senior /Navigator

## 2024-07-29 NOTE — PROGRESS NOTES
Vince Velásquez Pantera Avelar  Admission Date: 7/25/2024         [facility] Progress Note: 7/29/2024    Follow-up for: ENDY/CKD IV    The patient's chart is reviewed and the patient is discussed with the staff.    Subjective:   Pt seen and examined in room, son at the bedside, pt Community Nurse on speaker phone, reports LE edema improving. Noted coleman with uop  Remains on bumex gtts  DW Dr. Goode     ROS:  Gen - no fever, no chills, appetite unchanged  CV - no chest pain, no palpitation  Lung - no shortness of breath, no cough  Abd - no tenderness, no nausea/vomiting, no diarrhea  Ext - + edema    Current Facility-Administered Medications   Medication Dose Route Frequency    insulin glargine (LANTUS) injection vial 4 Units  4 Units SubCUTAneous QAM    atorvastatin (LIPITOR) tablet 40 mg  40 mg Oral Nightly    docusate sodium (COLACE) capsule 200 mg  200 mg Oral Daily    senna (SENOKOT) tablet 8.6 mg  1 tablet Oral BID    polyethylene glycol (GLYCOLAX) packet 17 g  17 g Oral BID    epoetin lloyd-epbx (RETACRIT) injection 10,000 Units  10,000 Units SubCUTAneous Weekly    lisinopril (PRINIVIL;ZESTRIL) tablet 2.5 mg  2.5 mg Oral Daily    bumetanide (BUMEX) 12.5 mg in 50 mL infusion  0.5 mg/hr IntraVENous Continuous    finasteride (PROSCAR) tablet 5 mg  5 mg Oral Daily    folic acid (FOLVITE) tablet 1 mg  1 mg Oral Daily    labetalol (NORMODYNE;TRANDATE) injection 5 mg  5 mg IntraVENous Q6H PRN    sodium chloride flush 0.9 % injection 5-40 mL  5-40 mL IntraVENous 2 times per day    sodium chloride flush 0.9 % injection 5-40 mL  5-40 mL IntraVENous PRN    0.9 % sodium chloride infusion   IntraVENous PRN    polyethylene glycol (GLYCOLAX) packet 17 g  17 g Oral Daily PRN    bisacodyl (DULCOLAX) suppository 10 mg  10 mg Rectal Daily PRN    famotidine (PEPCID) tablet 10 mg  10 mg Oral Daily PRN    aluminum & magnesium hydroxide-simethicone (MAALOX) 200-200-20 MG/5ML suspension 30 mL  30 mL

## 2024-07-29 NOTE — PROGRESS NOTES
Hospitalist Progress Note   Admit Date:  2024  3:37 PM   Name:  Vince Avelar   Age:  53 y.o.  Sex:  male  :  1970   MRN:  [id number]   Room:  [facility]/    Presenting/Chief Complaint: Shortness of Breath     Reason(s) for Admission: Pulmonary edema, acute (HCC) [J81.0]     Hospital Course:   Vince Avelar is a 53 y.o. male with medical history of HTN, T2DM, anemia, BPH with urinary retention who presented with SOB.    Vince initially presented to the emergency department referred by his primary care physician for progressive dyspnea, hypoxia and lower extremity swelling.  He was recently admitted on 07/10 where he was found to have new onset renal failure with volume overload ultimately requiring torsemide.  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  Additionally he required a Oliver catheter at discharge for urinary retention.  Per the initial H&P, patient was not compliant with his home torsemide.  In the ER, TM 98.1 °F, RR 20, HR 88, /70, SpO2 92% on room air.  Labs remarkable for Na+ 130, CO2 24, Glu 120, Cr 4.29 (recent baseline 4.61), and hemoglobin of 7.6 (recent baseline 7.7).  CXR demonstrated ill-defined opacities in the bilateral lung bases likely representing pleural effusions and atelectasis with prominence of interstitial markings and central pulmonary vasculature likely representing pulmonary edema.  Cardiomegaly was present as well.  Hospital medicine team was consulted for admission.    Subjective & 24hr Events:   Interview conducted via trained .  No acute events overnight.  Patient was evaluated bedside.  Feeling well today. No complaints.    Slept: Without issue    Eating: Okay    Drinking: Okay    Stooling: Okay    Voiding: Well    Pain: none    ROS  Pertinent positives:  As outlined above    Pertinent negatives:  Chest Pain, Nausea, Vomiting,  software.  The note has been proof read but may still contain some grammatical/other typographical errors.

## 2024-07-29 NOTE — CARE COORDINATION
MADIE is working with the pt community contact: Nataliia Bernard / Community Wellness Outreach Clinical Lead RN to coordinate pt medication treatment plan. Nataliia reports pt has been approved for SPIRIT [employer] and will need a hard Rx from MD for Victoza and give to pt son at discharge, pt son will take it to Nataliia for med assistance. MADIE will monitor for Rxs orders and communicate with Nataliia for discharge plan. Per IDR this morning, anticipate discharge Wed, 7/31.

## 2024-07-29 NOTE — PROGRESS NOTES
ACUTE PHYSICAL THERAPY GOALS:   (Developed with and agreed upon by patient and/or caregiver.)    (1.) Vince Avelar  will move from supine to sit and sit to supine  and scoot up and down with INDEPENDENCE within 7 treatment day(s).    (2.) Vince Avelar will transfer from bed to chair and chair to bed with MODIFIED INDEPENDENCE using the least restrictive device within 7 treatment day(s).    (3.) Vince Avelar will ambulate with MODIFIED INDEPENDENCE for 500 feet with the least restrictive device within 7 treatment day(s).   (4.) Vince Avelar will perform standing static and dynamic balance activities x 10 minutes with MODIFIED INDEPENDENCE to improve safety within 7 treatment day(s).  (5.) Vince Avelar will perform therapeutic exercises x 15 min for HEP with INDEPENDENCE to improve strength, endurance, and functional mobility within 7 treatment day(s)    PHYSICAL THERAPY: Daily Note PM   (Link to Caseload Tracking: PT Visit Days : 2  Time In/Out PT Charge Capture  Rehab Caseload Tracker  Orders    Vince Avelar is a 53 y.o. male   PRIMARY DIAGNOSIS: Pulmonary edema, acute (HCC)  Pulmonary edema, acute (HCC) [J81.0]       Inpatient: Payor: /     ASSESSMENT:     REHAB RECOMMENDATIONS:   Recommendation to date pending progress:  Setting:  Home Health Therapy    Equipment:    Rolling Walker     ASSESSMENT:  Mr. Priti Avelar is supine in bed and agreeable to therapy,   used #[id number].  Bed mobility is with mod assist to get to the edge of the bed. With additional time for task completion.  Sitting balance good.  Sit to stand with mod assist to the walker, balance unsteady at first but does improve.  Gait training with rolling walker x 250 feet with very slow rayo with several standing rest breaks.  Patient is returned to the room and sat edge of bed and then was returned to supine in

## 2024-07-29 NOTE — PROGRESS NOTES
Pt had large bowel movement overnight.  Is having difficulty ambulating due to edema.  Denies pain and nausea.  Hourly rounds performed this shift.  VS stable.  Pt medicated per MAR.  All known needs met at this time.  Bed low and locked, call light in reach.  Bedside shift report given to oncoming nurse.

## 2024-07-30 LAB
ANION GAP SERPL CALC-SCNC: 10 MMOL/L (ref 9–18)
BUN SERPL-MCNC: 62 MG/DL (ref 6–23)
CALCIUM SERPL-MCNC: 7.9 MG/DL (ref 8.8–10.2)
CHLORIDE SERPL-SCNC: 96 MMOL/L (ref 98–107)
CO2 SERPL-SCNC: 26 MMOL/L (ref 20–28)
CREAT SERPL-MCNC: 4.34 MG/DL (ref 0.8–1.3)
GLUCOSE BLD STRIP.AUTO-MCNC: 110 MG/DL (ref 65–100)
GLUCOSE BLD STRIP.AUTO-MCNC: 113 MG/DL (ref 65–100)
GLUCOSE BLD STRIP.AUTO-MCNC: 117 MG/DL (ref 65–100)
GLUCOSE BLD STRIP.AUTO-MCNC: 151 MG/DL (ref 65–100)
GLUCOSE SERPL-MCNC: 123 MG/DL (ref 70–99)
MAGNESIUM SERPL-MCNC: 2 MG/DL (ref 1.8–2.4)
PHOSPHATE SERPL-MCNC: 6 MG/DL (ref 2.5–4.5)
POTASSIUM SERPL-SCNC: 4.1 MMOL/L (ref 3.5–5.1)
SERVICE CMNT-IMP: ABNORMAL
SODIUM SERPL-SCNC: 131 MMOL/L (ref 136–145)

## 2024-07-30 PROCEDURE — 83735 ASSAY OF MAGNESIUM: CPT

## 2024-07-30 PROCEDURE — 6360000002 HC RX W HCPCS: Performed by: INTERNAL MEDICINE

## 2024-07-30 PROCEDURE — 80048 BASIC METABOLIC PNL TOTAL CA: CPT

## 2024-07-30 PROCEDURE — 6370000000 HC RX 637 (ALT 250 FOR IP): Performed by: INTERNAL MEDICINE

## 2024-07-30 PROCEDURE — 6370000000 HC RX 637 (ALT 250 FOR IP): Performed by: FAMILY MEDICINE

## 2024-07-30 PROCEDURE — 1100000000 HC RM PRIVATE

## 2024-07-30 PROCEDURE — 82962 GLUCOSE BLOOD TEST: CPT

## 2024-07-30 PROCEDURE — 2580000003 HC RX 258: Performed by: FAMILY MEDICINE

## 2024-07-30 PROCEDURE — 36415 COLL VENOUS BLD VENIPUNCTURE: CPT

## 2024-07-30 PROCEDURE — 84100 ASSAY OF PHOSPHORUS: CPT

## 2024-07-30 RX ORDER — LIRAGLUTIDE 6 MG/ML
0.6 INJECTION SUBCUTANEOUS WEEKLY
Qty: 2 ML | Refills: 0 | Status: SHIPPED | OUTPATIENT
Start: 2024-07-30 | End: 2024-07-31 | Stop reason: HOSPADM

## 2024-07-30 RX ORDER — INSULIN GLARGINE 100 [IU]/ML
4 INJECTION, SOLUTION SUBCUTANEOUS DAILY
Qty: 3.6 ML | Refills: 0 | Status: SHIPPED | OUTPATIENT
Start: 2024-07-30 | End: 2024-07-31 | Stop reason: HOSPADM

## 2024-07-30 RX ADMIN — DOCUSATE SODIUM 200 MG: 100 CAPSULE, LIQUID FILLED ORAL at 08:19

## 2024-07-30 RX ADMIN — HYDRALAZINE HYDROCHLORIDE 50 MG: 50 TABLET ORAL at 05:11

## 2024-07-30 RX ADMIN — POLYETHYLENE GLYCOL 3350 17 G: 17 POWDER, FOR SOLUTION ORAL at 08:19

## 2024-07-30 RX ADMIN — INSULIN GLARGINE 4 UNITS: 100 INJECTION, SOLUTION SUBCUTANEOUS at 08:18

## 2024-07-30 RX ADMIN — FINASTERIDE 5 MG: 5 TABLET, FILM COATED ORAL at 08:19

## 2024-07-30 RX ADMIN — POLYETHYLENE GLYCOL 3350 17 G: 17 POWDER, FOR SOLUTION ORAL at 21:33

## 2024-07-30 RX ADMIN — HYDRALAZINE HYDROCHLORIDE 50 MG: 50 TABLET ORAL at 21:32

## 2024-07-30 RX ADMIN — HYDRALAZINE HYDROCHLORIDE 50 MG: 50 TABLET ORAL at 14:10

## 2024-07-30 RX ADMIN — BUMETANIDE 0.5 MG/HR: 0.25 INJECTION INTRAMUSCULAR; INTRAVENOUS at 14:13

## 2024-07-30 RX ADMIN — AMLODIPINE BESYLATE 10 MG: 10 TABLET ORAL at 08:19

## 2024-07-30 RX ADMIN — ATORVASTATIN CALCIUM 40 MG: 40 TABLET, FILM COATED ORAL at 21:32

## 2024-07-30 RX ADMIN — SODIUM CHLORIDE, PRESERVATIVE FREE 10 ML: 5 INJECTION INTRAVENOUS at 21:33

## 2024-07-30 RX ADMIN — FOLIC ACID 1 MG: 1 TABLET ORAL at 21:33

## 2024-07-30 RX ADMIN — SENNOSIDES 8.6 MG: 8.6 TABLET, FILM COATED ORAL at 21:32

## 2024-07-30 RX ADMIN — LISINOPRIL 2.5 MG: 5 TABLET ORAL at 08:19

## 2024-07-30 RX ADMIN — TAMSULOSIN HYDROCHLORIDE 0.4 MG: 0.4 CAPSULE ORAL at 08:19

## 2024-07-30 RX ADMIN — SENNOSIDES 8.6 MG: 8.6 TABLET, FILM COATED ORAL at 08:19

## 2024-07-30 NOTE — CARE COORDINATION
MADIE met with MD at rounds this morning and he stated he will give pt son the needed hard Rx for Victoza, son will take it to Nataliia, [phone] , . She is helping with coordination. Pt is with Viral, see MADIE note from 7/29.

## 2024-07-30 NOTE — PROGRESS NOTES
/Cultural Navigator rounded in person and assessed patient's language needs.  Patient stated that communication was satisfactory via  services. An opportunity for questions and clarifications were provided. Language services interpreting resources were offered as needed.       Thank you,          Gregoria BERGERON  Senior /Navigator   Language Services Department  [phone] (phone)

## 2024-07-30 NOTE — DIABETES MGMT
Patient admitted with pulmonary edema. Blood glucose ranged 141-179 yesterday with patient receiving Lantus 4 units. Blood glucose this morning was 113. Creatinine 4.34. GFR 15. Reviewed patient current regimen: Lantus 4 units daily and Humalog correctional insulin.  Glycemic control overall in hospital blood glucose target goals.  Will follow along.

## 2024-07-30 NOTE — PROGRESS NOTES
Recent Labs     07/28/24  0514 07/29/24  0517 07/30/24  0615   * 131* 131*   K 4.0 3.9 4.1   CL 94* 97* 96*   CO2 24 26 26   BUN 59* 60* 62*   CREATININE 4.29* 4.30* 4.34*   MG 2.1 2.1 2.0   PHOS 5.7* 6.3* 6.0*       No results for input(s): \"PH\", \"PCO2\", \"PO2\", \"HCO3\" in the last 72 hours.      Assessment/Plan:  (Medical Decision Making)   Acute kidney injury/CKD IV (Diabetic/hypertensive kidney disease)  -Bcr unclear, but appear to be in mid-to upper-3's  -Renal biopsy 7/15 with globally sclerosed glomerulus, focal interstitial fibrosis, tubular atrophy, and moderate arterial sclerosis. Severe nephrotic syndrome with 8.8 g albuminuria.    -Explained to patient and son that kidney disease is severe and will likely need dialysis at some point. Unfortunately, undocumented/uninsured status makes situation difficult  -Avoid nephrotoxic agents  -Accurate I/Os  -Renal function steady state, great uop with diuresis     Anasarca  -Continue IV bumex gtt      Renal Mass -Has appt with urology as outpatient. Home coleman     DM type 2- insulin per primary     Hypertension   -Amlodipine  -Hydralazine  -Started low dose ACE-I- BP better controlled      Anemia - s/p Fe, on Retacrit       Marty Felder, APRN - CNP  [facility]

## 2024-07-30 NOTE — PROGRESS NOTES
Hospitalist Progress Note   Admit Date:  2024  3:37 PM   Name:  Vince Avelar   Age:  53 y.o.  Sex:  male  :  1970   MRN:  [id number]   Room:  [facility]/    Presenting/Chief Complaint: Shortness of Breath     Reason(s) for Admission: Pulmonary edema, acute (HCC) [J81.0]     Hospital Course:   Vince Avelar is a 53 y.o. male with medical history of HTN, T2DM, anemia, BPH with urinary retention who presented with SOB.    Vince initially presented to the emergency department referred by his primary care physician for progressive dyspnea, hypoxia and lower extremity swelling.  He was recently admitted on 07/10 where he was found to have new onset renal failure with volume overload ultimately requiring torsemide.  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  Additionally he required a Oliver catheter at discharge for urinary retention.  Per the initial H&P, patient was not compliant with his home torsemide.  In the ER, TM 98.1 °F, RR 20, HR 88, /70, SpO2 92% on room air.  Labs remarkable for Na+ 130, CO2 24, Glu 120, Cr 4.29 (recent baseline 4.61), and hemoglobin of 7.6 (recent baseline 7.7).  CXR demonstrated ill-defined opacities in the bilateral lung bases likely representing pleural effusions and atelectasis with prominence of interstitial markings and central pulmonary vasculature likely representing pulmonary edema.  Cardiomegaly was present as well.  Hospital medicine team was consulted for admission.    On the floor, patient was found to be severely volume overloaded and with recent normal echocardiogram and recent renal biopsy was considered to be nephrogenic in nature.  Nephrology was consulted to assist with management.  He was started on a Bumex drip and had excellent urine output.  Son was at the bedside endorsing that they were compliant with his home torsemide which was dosed at 60 mg  Glucose    Collection Time: 07/29/24  7:35 AM   Result Value Ref Range    POC Glucose 141 (H) 65 - 100 mg/dL    Performed by: YaniindMary Jane    POCT Glucose    Collection Time: 07/29/24 11:20 AM   Result Value Ref Range    POC Glucose 152 (H) 65 - 100 mg/dL    Performed by: OmaruittLindaV    POCT Glucose    Collection Time: 07/29/24  4:14 PM   Result Value Ref Range    POC Glucose 170 (H) 65 - 100 mg/dL    Performed by: NoeltLindMary Jane    POCT Glucose    Collection Time: 07/29/24  7:20 PM   Result Value Ref Range    POC Glucose 179 (H) 65 - 100 mg/dL    Performed by: Kaleb    Basic Metabolic Panel    Collection Time: 07/30/24  6:15 AM   Result Value Ref Range    Sodium 131 (L) 136 - 145 mmol/L    Potassium 4.1 3.5 - 5.1 mmol/L    Chloride 96 (L) 98 - 107 mmol/L    CO2 26 20 - 28 mmol/L    Anion Gap 10 9 - 18 mmol/L    Glucose 123 (H) 70 - 99 mg/dL    BUN 62 (H) 6 - 23 MG/DL    Creatinine 4.34 (H) 0.80 - 1.30 MG/DL    Est, Glom Filt Rate 15 (L) >60 ml/min/1.73m2    Calcium 7.9 (L) 8.8 - 10.2 MG/DL   Magnesium    Collection Time: 07/30/24  6:15 AM   Result Value Ref Range    Magnesium 2.0 1.8 - 2.4 mg/dL   Phosphorus    Collection Time: 07/30/24  6:15 AM   Result Value Ref Range    Phosphorus 6.0 (H) 2.5 - 4.5 MG/DL   POCT Glucose    Collection Time: 07/30/24  7:22 AM   Result Value Ref Range    POC Glucose 113 (H) 65 - 100 mg/dL    Performed by: José Miguel    POCT Glucose    Collection Time: 07/30/24 10:53 AM   Result Value Ref Range    POC Glucose 110 (H) 65 - 100 mg/dL    Performed by: José Miguel        No results for input(s): \"COVID19\" in the last 72 hours.    Current Meds:  Current Facility-Administered Medications   Medication Dose Route Frequency    insulin glargine (LANTUS) injection vial 4 Units  4 Units SubCUTAneous QAM    atorvastatin (LIPITOR) tablet 40 mg  40 mg Oral Nightly    docusate sodium (COLACE) capsule 200 mg  200 mg Oral Daily    senna (SENOKOT) tablet 8.6 mg  1 tablet Oral BID

## 2024-07-30 NOTE — PROGRESS NOTES
Pt was consulted with diabetic management this shift. Pt is expected to be discharged in the next day or two. Pt did not complain of pain this shift. Pt took a shower this shift.     All known needs met at this time. Bed is currently low, call light was left in reach, and pt was encouraged to ask for assistance. Pt was left resting in bed with no complaints. Will give bedside report to oncoming nurse.

## 2024-07-31 VITALS
HEART RATE: 88 BPM | OXYGEN SATURATION: 92 % | RESPIRATION RATE: 18 BRPM | DIASTOLIC BLOOD PRESSURE: 84 MMHG | HEIGHT: 62 IN | SYSTOLIC BLOOD PRESSURE: 164 MMHG | TEMPERATURE: 97.3 F | BODY MASS INDEX: 36.19 KG/M2 | WEIGHT: 196.65 LBS

## 2024-07-31 LAB
ANION GAP SERPL CALC-SCNC: 11 MMOL/L (ref 9–18)
BUN SERPL-MCNC: 61 MG/DL (ref 6–23)
CALCIUM SERPL-MCNC: 7.9 MG/DL (ref 8.8–10.2)
CHLORIDE SERPL-SCNC: 96 MMOL/L (ref 98–107)
CO2 SERPL-SCNC: 26 MMOL/L (ref 20–28)
CREAT SERPL-MCNC: 4.42 MG/DL (ref 0.8–1.3)
GLUCOSE BLD STRIP.AUTO-MCNC: 123 MG/DL (ref 65–100)
GLUCOSE BLD STRIP.AUTO-MCNC: 126 MG/DL (ref 65–100)
GLUCOSE SERPL-MCNC: 125 MG/DL (ref 70–99)
MAGNESIUM SERPL-MCNC: 2 MG/DL (ref 1.8–2.4)
PHOSPHATE SERPL-MCNC: 6 MG/DL (ref 2.5–4.5)
POTASSIUM SERPL-SCNC: 3.8 MMOL/L (ref 3.5–5.1)
SERVICE CMNT-IMP: ABNORMAL
SERVICE CMNT-IMP: ABNORMAL
SODIUM SERPL-SCNC: 132 MMOL/L (ref 136–145)

## 2024-07-31 PROCEDURE — 6360000002 HC RX W HCPCS: Performed by: INTERNAL MEDICINE

## 2024-07-31 PROCEDURE — 80048 BASIC METABOLIC PNL TOTAL CA: CPT

## 2024-07-31 PROCEDURE — 6370000000 HC RX 637 (ALT 250 FOR IP): Performed by: FAMILY MEDICINE

## 2024-07-31 PROCEDURE — 82962 GLUCOSE BLOOD TEST: CPT

## 2024-07-31 PROCEDURE — 6370000000 HC RX 637 (ALT 250 FOR IP): Performed by: NURSE PRACTITIONER

## 2024-07-31 PROCEDURE — 6370000000 HC RX 637 (ALT 250 FOR IP): Performed by: INTERNAL MEDICINE

## 2024-07-31 PROCEDURE — 36415 COLL VENOUS BLD VENIPUNCTURE: CPT

## 2024-07-31 PROCEDURE — 84100 ASSAY OF PHOSPHORUS: CPT

## 2024-07-31 PROCEDURE — 83735 ASSAY OF MAGNESIUM: CPT

## 2024-07-31 RX ORDER — LISINOPRIL 5 MG/1
5 TABLET ORAL DAILY
Qty: 30 TABLET | Refills: 0 | Status: SHIPPED | OUTPATIENT
Start: 2024-08-01 | End: 2024-08-31

## 2024-07-31 RX ORDER — BUMETANIDE 1 MG/1
2 TABLET ORAL 2 TIMES DAILY
Status: DISCONTINUED | OUTPATIENT
Start: 2024-07-31 | End: 2024-07-31

## 2024-07-31 RX ORDER — DULAGLUTIDE 0.75 MG/.5ML
0.75 INJECTION, SOLUTION SUBCUTANEOUS WEEKLY
Qty: 4 ADJUSTABLE DOSE PRE-FILLED PEN SYRINGE | Refills: 0 | Status: SHIPPED | OUTPATIENT
Start: 2024-07-31

## 2024-07-31 RX ORDER — FINASTERIDE 5 MG/1
5 TABLET, FILM COATED ORAL DAILY
Qty: 30 TABLET | Refills: 0 | Status: SHIPPED | OUTPATIENT
Start: 2024-08-01

## 2024-07-31 RX ORDER — BUMETANIDE 1 MG/1
2 TABLET ORAL 3 TIMES DAILY
Status: DISCONTINUED | OUTPATIENT
Start: 2024-07-31 | End: 2024-07-31 | Stop reason: HOSPADM

## 2024-07-31 RX ORDER — ATORVASTATIN CALCIUM 40 MG/1
40 TABLET, FILM COATED ORAL NIGHTLY
Qty: 30 TABLET | Refills: 0 | Status: SHIPPED | OUTPATIENT
Start: 2024-07-31

## 2024-07-31 RX ORDER — LISINOPRIL 5 MG/1
5 TABLET ORAL DAILY
Status: DISCONTINUED | OUTPATIENT
Start: 2024-08-01 | End: 2024-07-31 | Stop reason: HOSPADM

## 2024-07-31 RX ORDER — BUMETANIDE 1 MG/1
4 TABLET ORAL 2 TIMES DAILY
Status: DISCONTINUED | OUTPATIENT
Start: 2024-07-31 | End: 2024-07-31

## 2024-07-31 RX ORDER — BUMETANIDE 2 MG/1
2 TABLET ORAL 2 TIMES DAILY
Qty: 30 TABLET | Refills: 3 | Status: SHIPPED | OUTPATIENT
Start: 2024-07-31

## 2024-07-31 RX ORDER — TAMSULOSIN HYDROCHLORIDE 0.4 MG/1
0.4 CAPSULE ORAL DAILY
Qty: 30 CAPSULE | Refills: 0 | Status: SHIPPED | OUTPATIENT
Start: 2024-08-01 | End: 2024-08-31

## 2024-07-31 RX ADMIN — BUMETANIDE 0.5 MG/HR: 0.25 INJECTION INTRAMUSCULAR; INTRAVENOUS at 03:37

## 2024-07-31 RX ADMIN — AMLODIPINE BESYLATE 10 MG: 10 TABLET ORAL at 09:25

## 2024-07-31 RX ADMIN — HYDRALAZINE HYDROCHLORIDE 50 MG: 50 TABLET ORAL at 14:17

## 2024-07-31 RX ADMIN — TAMSULOSIN HYDROCHLORIDE 0.4 MG: 0.4 CAPSULE ORAL at 09:25

## 2024-07-31 RX ADMIN — POLYETHYLENE GLYCOL 3350 17 G: 17 POWDER, FOR SOLUTION ORAL at 09:26

## 2024-07-31 RX ADMIN — HYDRALAZINE HYDROCHLORIDE 50 MG: 50 TABLET ORAL at 05:06

## 2024-07-31 RX ADMIN — INSULIN GLARGINE 4 UNITS: 100 INJECTION, SOLUTION SUBCUTANEOUS at 09:25

## 2024-07-31 RX ADMIN — FINASTERIDE 5 MG: 5 TABLET, FILM COATED ORAL at 09:25

## 2024-07-31 RX ADMIN — BUMETANIDE 2 MG: 1 TABLET ORAL at 14:17

## 2024-07-31 RX ADMIN — DOCUSATE SODIUM 200 MG: 100 CAPSULE, LIQUID FILLED ORAL at 09:25

## 2024-07-31 RX ADMIN — SENNOSIDES 8.6 MG: 8.6 TABLET, FILM COATED ORAL at 09:25

## 2024-07-31 RX ADMIN — LISINOPRIL 2.5 MG: 5 TABLET ORAL at 09:25

## 2024-07-31 NOTE — DIABETES MGMT
Patient admitted with acute pulmonary edema. Blood glucose ranged 110-151 yesterday with patient receiving Lantus 4 units. Blood glucose this morning was 126. Most recent poc glucose 123. Creatinine 4.42. GFR 15. Reviewed patient current regimen: Lantus 4 units daily and Humalog correctional insulin. Patient seen for follow up diabetes education as requested by provider. Per provider note from yesterday patient to discharge on lantus 4 units daily and given victoza weekly prescription. Victoza typically given daily, but [facility] does cover Trulicity weekly, provider from today updated via [employer].    Patient seen for follow up diabetes education. Interpretering services utilized as patient and son speak Turkmen (Whitney #[id number]). Reviewed basal insulin versus bolus insulin versus GLP-1 medications. Educated on how to use Victoza versus Trulicity. Encouraged compliance with discharge regimen. Encouraged patient to continue to work on lifestyle modifications and to follow up with primary care provider for further titration of regimen. Patient states community outreach nurse following to help him with medications and follow up care. Reviewed importance of fingerstick blood sugar monitoring, record in log, and follow up with PCP. Patient verbalized understanding and voices no further questions regarding diabetes management at this time.

## 2024-07-31 NOTE — PROGRESS NOTES
Patient/caregivers speak [place] Island[place]  as their preferred language for their healthcare communication. For safe communication, use the [facility]  carts or call:    Senior /Navigator Gregoria Carr at [phone] or   [employer] phone services for [facility] at [phone]    General phone: [phone] ( [phone])  Email: [email or web address]    Always document the use of interpreting services ('s ID number) in your clinical notes.    Our interpreters are available for team members working with limited  English proficient (LEP) patients remotely, via phone or video or in person (if needed for special cases).    When using family members to interpret, for the safety of the patient and protection of the communication of both our patient and [facility] staff the VRI or telephonic  should remain on the line to monitor that all communication is accurate and complete. The  should be instructed to notify [facility] staff immediately if there are any inaccuracies.         Thank you,        Gregoria BERGERON  Senior /Navigator

## 2024-07-31 NOTE — PROGRESS NOTES
Vince Velásquez Pantera Avelar  Admission Date: 7/25/2024         [facility] Progress Note: 7/31/2024    Follow-up for: ENDY/CKD IV    The patient's chart is reviewed and the patient is discussed with the staff.    Subjective:   Pt seen and examined in room, son at the bedside, pt reports LE edema improving still with c/o scrotal edema, great uop via coleman.     ROS:  Gen - no fever, no chills, appetite unchanged  CV - no chest pain, no palpitation  Lung - no shortness of breath, no cough  Abd - no tenderness, no nausea/vomiting, no diarrhea  Ext - + edema    Current Facility-Administered Medications   Medication Dose Route Frequency    bumetanide (BUMEX) tablet 2 mg  2 mg Oral BID    insulin glargine (LANTUS) injection vial 4 Units  4 Units SubCUTAneous QAM    atorvastatin (LIPITOR) tablet 40 mg  40 mg Oral Nightly    docusate sodium (COLACE) capsule 200 mg  200 mg Oral Daily    senna (SENOKOT) tablet 8.6 mg  1 tablet Oral BID    polyethylene glycol (GLYCOLAX) packet 17 g  17 g Oral BID    epoetin lloyd-epbx (RETACRIT) injection 10,000 Units  10,000 Units SubCUTAneous Weekly    lisinopril (PRINIVIL;ZESTRIL) tablet 2.5 mg  2.5 mg Oral Daily    finasteride (PROSCAR) tablet 5 mg  5 mg Oral Daily    folic acid (FOLVITE) tablet 1 mg  1 mg Oral Daily    labetalol (NORMODYNE;TRANDATE) injection 5 mg  5 mg IntraVENous Q6H PRN    sodium chloride flush 0.9 % injection 5-40 mL  5-40 mL IntraVENous 2 times per day    sodium chloride flush 0.9 % injection 5-40 mL  5-40 mL IntraVENous PRN    0.9 % sodium chloride infusion   IntraVENous PRN    polyethylene glycol (GLYCOLAX) packet 17 g  17 g Oral Daily PRN    bisacodyl (DULCOLAX) suppository 10 mg  10 mg Rectal Daily PRN    famotidine (PEPCID) tablet 10 mg  10 mg Oral Daily PRN    aluminum & magnesium hydroxide-simethicone (MAALOX) 200-200-20 MG/5ML suspension 30 mL  30 mL Oral Q6H PRN    acetaminophen (TYLENOL) tablet 650 mg  650 mg Oral Q6H PRN

## 2024-07-31 NOTE — PROGRESS NOTES
Provided interpreting services over the phone for discharge instructions with Franca Robertson, RAYA.      Thank you,        Gregoria BERGERON  Senior /Navigator   Language Services Department  [phone] (phone)

## 2024-07-31 NOTE — DISCHARGE SUMMARY
Hospitalist Discharge Summary   Admit Date:  2024  3:37 PM   DC Note date: 2024  Name:  Vince Avelar   Age:  53 y.o.  Sex:  male  :  1970   MRN:  [id number]   Room:  [facility]  PCP:  No primary care provider on file.    Presenting Complaint: Shortness of Breath     Initial Admission Diagnosis: Pulmonary edema, acute (HCC) [J81.0]     Problem List for this Hospitalization (present on admission):    Principal Problem:    Pulmonary edema, acute (HCC)  Active Problems:    ENDY (acute kidney injury) (HCC)    DM (diabetes mellitus) (HCC)    Normocytic anemia    Renal lesion    Benign prostatic hyperplasia with urinary obstruction    Hyponatremia    Acute respiratory insufficiency    Hypervolemia    HTN (hypertension)    Vision changes    Troponin I above reference range    Folate deficiency    Hyperlipidemia    Constipation  Resolved Problems:    * No resolved hospital problems. *    Vince Avelar is a 53 y.o. male with medical history of HTN, T2DM, anemia, BPH with urinary retention who presented with SOB.     Interval History (): patient examined at bedside. No acute overnight events. Still with some swelling \"especially in my stomach\" but it has improved overall. Son at bedside.  used for encounter. Denies chest pain or shortness of breath.     Hospital Course:    Vince initially presented to the emergency department referred by his primary care physician for progressive dyspnea, hypoxia and lower extremity swelling.  He was recently admitted on 07/10 where he was found to have new onset renal failure with volume overload ultimately requiring torsemide.  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  Additionally he required a Oliver catheter at discharge for urinary retention.  Per the initial H&P, patient was not compliant with his home torsemide.  In the ER, TM 98.1 °F, RR 20, HR 88, BP  sterile gloves, sterile sheet, hand hygiene, and cutaneous antisepsis) was used.  Anesthesia/sedation Level of anesthesia/sedation: Moderate sedation (conscious sedation) Anesthesia/sedation administered by: Independent trained observer under attending supervision with continuous monitoring of the patient?s level of consciousness and physiologic status Total intra-service sedation time (minutes): 22 Imaging prior to biopsy The patient was positioned prone. Initial imaging was performed using noncontrast CT. Biopsy target: - Maximal diameter (cm): Not applicable - Location: Left kidney, lower pole Other findings: 4.7 cm, slightly heterogeneous but essentially soft tissue density, well-circumscribed, rounded, exophytic mass arising from the lower pole of the right kidney. Biopsy Local anesthesia was administered. Under CT guidance, the coaxial biopsy system was advanced to the target and Core biopsy was performed. Coaxial needle: 17 gauge Core needle biopsy device: 10Six Core needle size: 18 gauge Number of core specimens: 5 Specimen sent to:  Pathology. Preservative solution:  Formalin and [employer] On-site biopsy touch preparation: No  Additional sampling recommendations: Not applicable Preliminary assessment of sample adequacy: Not applicable Needle removal The biopsy needle was removed and a sterile dressing was applied. Tract embolization: Gelfoam torpedoes Imaging following biopsy Immediate post-biopsy imaging was performed. Imaging modality: noncontrast CT. Post-biopsy imaging findings: No significant hematoma identified Contrast Contrast agent: None Contrast volume (mL): 0 Radiation Dose CT dose length product (mGy-cm): 1100.45 Additional Details Specimens removed: Pathology Estimated blood loss (mL): Less than 10 Standardized report: [email or web address] Attestation Signer name: Malik Rutledge M.D. I attest that I personally performed the entire procedure. I reviewed the stored images and

## 2024-07-31 NOTE — PROGRESS NOTES
Total number of Prescriptions Filled: 1    List of Medications (name,strength): Trulicity 0.75        Delivered to: RAYA Schulte      Co-pay:$0.00      Payment Type: cash - $0.00      Additional Documentation:  Medications given to nurse Franca to provide to patient - Rx requires refrigeration      Thank you for letting us serve your patients.  [facility] [id number]- [place] [address]  Phone: [phone]  Fax: [phone]

## 2024-07-31 NOTE — CARE COORDINATION
Discharge note:  Trulicity provided by [facility] (delivered to room) and he already has Lantus at home.  [facility] applied and approved for chronic meds. He is already at [facility] at [address] [place] [address], [phone]   No other needs voiced or identified.

## 2024-08-07 ENCOUNTER — OFFICE VISIT (OUTPATIENT)
Dept: PRIMARY CARE CLINIC | Facility: CLINIC | Age: 54
End: 2024-08-07

## 2024-08-07 VITALS
HEART RATE: 87 BPM | WEIGHT: 190 LBS | HEIGHT: 62 IN | DIASTOLIC BLOOD PRESSURE: 69 MMHG | SYSTOLIC BLOOD PRESSURE: 143 MMHG | TEMPERATURE: 98.6 F | RESPIRATION RATE: 18 BRPM | OXYGEN SATURATION: 91 % | BODY MASS INDEX: 34.96 KG/M2

## 2024-08-07 DIAGNOSIS — E11.29 TYPE 2 DIABETES MELLITUS WITH OTHER DIABETIC KIDNEY COMPLICATION, WITH LONG-TERM CURRENT USE OF INSULIN (HCC): Primary | ICD-10-CM

## 2024-08-07 DIAGNOSIS — N13.8 BENIGN PROSTATIC HYPERPLASIA WITH URINARY OBSTRUCTION: ICD-10-CM

## 2024-08-07 DIAGNOSIS — I10 PRIMARY HYPERTENSION: ICD-10-CM

## 2024-08-07 DIAGNOSIS — N28.89 RIGHT RENAL MASS: ICD-10-CM

## 2024-08-07 DIAGNOSIS — N40.1 BENIGN PROSTATIC HYPERPLASIA WITH URINARY OBSTRUCTION: ICD-10-CM

## 2024-08-07 DIAGNOSIS — Z09 HOSPITAL DISCHARGE FOLLOW-UP: ICD-10-CM

## 2024-08-07 DIAGNOSIS — Z79.4 TYPE 2 DIABETES MELLITUS WITH OTHER DIABETIC KIDNEY COMPLICATION, WITH LONG-TERM CURRENT USE OF INSULIN (HCC): Primary | ICD-10-CM

## 2024-08-07 DIAGNOSIS — N17.9 AKI (ACUTE KIDNEY INJURY) (HCC): ICD-10-CM

## 2024-08-07 DIAGNOSIS — K59.00 CONSTIPATION, UNSPECIFIED CONSTIPATION TYPE: ICD-10-CM

## 2024-08-07 LAB — GLUCOSE, POC: 191 MG/DL

## 2024-08-07 PROCEDURE — 3044F HG A1C LEVEL LT 7.0%: CPT | Performed by: PHYSICIAN ASSISTANT

## 2024-08-07 PROCEDURE — 3078F DIAST BP <80 MM HG: CPT | Performed by: PHYSICIAN ASSISTANT

## 2024-08-07 PROCEDURE — 3077F SYST BP >= 140 MM HG: CPT | Performed by: PHYSICIAN ASSISTANT

## 2024-08-07 PROCEDURE — 82962 GLUCOSE BLOOD TEST: CPT | Performed by: PHYSICIAN ASSISTANT

## 2024-08-07 PROCEDURE — 99214 OFFICE O/P EST MOD 30 MIN: CPT | Performed by: PHYSICIAN ASSISTANT

## 2024-08-07 PROCEDURE — 1111F DSCHRG MED/CURRENT MED MERGE: CPT | Performed by: PHYSICIAN ASSISTANT

## 2024-08-07 RX ORDER — DULAGLUTIDE 0.75 MG/.5ML
0.75 INJECTION, SOLUTION SUBCUTANEOUS WEEKLY
Qty: 4 ADJUSTABLE DOSE PRE-FILLED PEN SYRINGE | Refills: 0 | Status: SHIPPED | OUTPATIENT
Start: 2024-08-07

## 2024-08-07 RX ORDER — BUMETANIDE 2 MG/1
2 TABLET ORAL 2 TIMES DAILY
Qty: 60 TABLET | Refills: 3 | Status: SHIPPED | OUTPATIENT
Start: 2024-08-07

## 2024-08-07 RX ORDER — INSULIN LISPRO 100 [IU]/ML
INJECTION, SOLUTION INTRAVENOUS; SUBCUTANEOUS
Qty: 15 ML | Refills: 1 | Status: SHIPPED | OUTPATIENT
Start: 2024-08-07

## 2024-08-07 RX ORDER — BUMETANIDE 2 MG/1
2 TABLET ORAL 2 TIMES DAILY
Qty: 30 TABLET | Refills: 3 | Status: SHIPPED | OUTPATIENT
Start: 2024-08-07 | End: 2024-08-07

## 2024-08-07 RX ORDER — LISINOPRIL 5 MG/1
5 TABLET ORAL DAILY
Qty: 30 TABLET | Refills: 0 | Status: SHIPPED | OUTPATIENT
Start: 2024-08-07 | End: 2024-09-06

## 2024-08-07 RX ORDER — TAMSULOSIN HYDROCHLORIDE 0.4 MG/1
0.4 CAPSULE ORAL DAILY
Qty: 30 CAPSULE | Refills: 0 | Status: SHIPPED | OUTPATIENT
Start: 2024-08-07 | End: 2024-09-06

## 2024-08-07 RX ORDER — AMLODIPINE BESYLATE 10 MG/1
10 TABLET ORAL DAILY
Qty: 30 TABLET | Refills: 3 | Status: SHIPPED | OUTPATIENT
Start: 2024-08-07

## 2024-08-07 RX ORDER — FINASTERIDE 5 MG/1
5 TABLET, FILM COATED ORAL DAILY
Qty: 30 TABLET | Refills: 0 | Status: SHIPPED | OUTPATIENT
Start: 2024-08-07

## 2024-08-07 RX ORDER — ATORVASTATIN CALCIUM 40 MG/1
40 TABLET, FILM COATED ORAL NIGHTLY
Qty: 30 TABLET | Refills: 0 | Status: SHIPPED | OUTPATIENT
Start: 2024-08-07

## 2024-08-07 RX ORDER — HYDRALAZINE HYDROCHLORIDE 50 MG/1
50 TABLET, FILM COATED ORAL EVERY 8 HOURS SCHEDULED
Qty: 90 TABLET | Refills: 3 | Status: SHIPPED | OUTPATIENT
Start: 2024-08-07

## 2024-08-07 NOTE — PROGRESS NOTES
Vince Avelar (:  1970) is a 53 y.o. male here for evaluation of the following chief complaint(s):  Follow-Up from Hospital (Pt presents for a hospital follow up, son is present at the time of this appointment, patient was admitted for 6 days and discharged on , pt states his breathing has gotten better. He is monitoring his O2 saturation at home and it is always around 90%. Fasting blood sugars have been 100-120.  His only new concern is constipation. Last BM was 2 days ago)      Assessment & Plan   ASSESSMENT/PLAN:  1. Type 2 diabetes mellitus with other diabetic kidney complication, with long-term current use of insulin ([facility])  Comments:  continue trulicity and sliding scale insulin, will change from vial to pens per patient request  Orders:  -     AMB POC GLUCOSE BLOOD, BY GLUCOSE MONITORING DEVICE  -     atorvastatin (LIPITOR) 40 MG tablet; Take 1 tablet by mouth nightly, Disp-30 tablet, R-0Normal  -     dulaglutide (TRULICITY) 0.75 MG/0.5ML SOPN SC injection; Inject 0.5 mLs into the skin once a week, Disp-4 Adjustable Dose Pre-filled Pen Syringe, R-0Normal  -     insulin lispro, 1 Unit Dial, (HUMALOG KWIKPEN) 100 UNIT/ML SOPN; Inject insulin 3 times daily with meals, subcutaneously, based on blood glucose: 141-180 inject 2 units, 181-220 inject 4 units, 221-260 inject 6 units, 261-300 inject 8 units, 301-350 inject 10 units, 351-400 inject 12 units , if > 400 call MD, Disp-15 mL, R-1Normal  -     Insulin Pen Needle 32G X 4 MM MISC; DAILY Starting 2024, Until 2024, For 90 days, Disp-100 each, R-3, Normal  2. Constipation, unspecified constipation type  Comments:  He will use sugar free metamucil daily  3. Primary hypertension  -     amLODIPine (NORVASC) 10 MG tablet; Take 1 tablet by mouth daily, Disp-30 tablet, R-3Normal  -     hydrALAZINE (APRESOLINE) 50 MG tablet; Take 1 tablet by mouth every 8 hours, Disp-90 tablet, R-3Normal  -     lisinopril

## 2024-08-08 ASSESSMENT — ENCOUNTER SYMPTOMS
ABDOMINAL DISTENTION: 0
WHEEZING: 0
BLOOD IN STOOL: 0
VOMITING: 0
SHORTNESS OF BREATH: 0
DIARRHEA: 0
CONSTIPATION: 1
NAUSEA: 0

## 2024-08-21 ENCOUNTER — OFFICE VISIT (OUTPATIENT)
Dept: UROLOGY | Age: 54
End: 2024-08-21

## 2024-08-21 DIAGNOSIS — N50.89 SCROTAL EDEMA: ICD-10-CM

## 2024-08-21 DIAGNOSIS — R33.9 URINE RETENTION: Primary | ICD-10-CM

## 2024-08-21 DIAGNOSIS — N28.89 RENAL MASS, RIGHT: ICD-10-CM

## 2024-08-21 LAB — PVR, POC: 40 CC

## 2024-08-21 PROCEDURE — 51798 US URINE CAPACITY MEASURE: CPT | Performed by: NURSE PRACTITIONER

## 2024-08-21 PROCEDURE — 99215 OFFICE O/P EST HI 40 MIN: CPT | Performed by: NURSE PRACTITIONER

## 2024-08-21 ASSESSMENT — ENCOUNTER SYMPTOMS
BACK PAIN: 0
EYE DISCHARGE: 0
HEARTBURN: 0
WHEEZING: 0
CONSTIPATION: 0
BLOOD IN STOOL: 0
SKIN LESIONS: 0
VOMITING: 0
ABDOMINAL PAIN: 0
INDIGESTION: 0
EYE PAIN: 0
SHORTNESS OF BREATH: 0
NAUSEA: 0
COUGH: 0
DIARRHEA: 0

## 2024-08-21 NOTE — PROGRESS NOTES
[facility]  [address]  [place] [address]  [phone]          Vince Avelar  : 1970    Chief Complaint   Patient presents with    New Patient          HPI     Vince Avelar is a 53 y.o. male w hx of DM2 hospitalized in  [facility] 2/2 to nephrotic syndrome. Cr 3.84 on adm. BRANDYN showed approx 5 cm right renal lesion w recs for fu CT; no hydro. Has sig scrotal edema requiring coleman catheter. Diuretics were started. Left renal bx was done for further eval of kidney disease. Path glomerulosclerosis. FOREST showed thickened scrotal wall. Flomax and finasteride started. He was able to void prior to leaving hospital. Unfortunately Cr did not improve enough to proceed w CT renal mass for right renal lesion. Cr 24 4.37.     Here today for fu. He is feeling ok. Compliant w meds including bumex. He is afebrile. Reports he is voiding decent amounts. PVR today is 40 cc via u/s. He is unable to give a voided urine sample. Scrotum is still quite enlarged making it difficult to ambulate.     He is scheduled to see nephrology 2024.     He is accompanied by hs son and his boss Domingo.     No family hx of Gu CA.     Visit conducted w [place] interpretor.     Pt discussed w Dr. Paige.     History reviewed. No pertinent past medical history.  Past Surgical History:   Procedure Laterality Date    CT BIOPSY RENAL  7/15/2024    CT BIOPSY RENAL 7/15/2024 [facility] RADIOLOGY CT SCAN     Current Outpatient Medications   Medication Sig Dispense Refill    atorvastatin (LIPITOR) 40 MG tablet Take 1 tablet by mouth nightly 30 tablet 0    finasteride (PROSCAR) 5 MG tablet Take 1 tablet by mouth daily 30 tablet 0    dulaglutide (TRULICITY) 0.75 MG/0.5ML SOPN SC injection Inject 0.5 mLs into the skin once a week 4 Adjustable Dose Pre-filled Pen Syringe 0    amLODIPine (NORVASC) 10 MG tablet Take 1 tablet by mouth daily 30 tablet 3    hydrALAZINE (APRESOLINE)